# Patient Record
Sex: MALE | Race: WHITE | NOT HISPANIC OR LATINO | Employment: UNEMPLOYED | URBAN - METROPOLITAN AREA
[De-identification: names, ages, dates, MRNs, and addresses within clinical notes are randomized per-mention and may not be internally consistent; named-entity substitution may affect disease eponyms.]

---
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
## 2018-01-22 RX ORDER — TRAZODONE HYDROCHLORIDE 100 MG/1
TABLET ORAL
Qty: 30 TABLET | Refills: 0 | Status: SHIPPED | OUTPATIENT
Start: 2018-01-22 | End: 2018-02-20 | Stop reason: SDUPTHER

## 2018-02-05 RX ORDER — ARIPIPRAZOLE 15 MG/1
TABLET ORAL
Qty: 30 TABLET | Refills: 1 | Status: SHIPPED | OUTPATIENT
Start: 2018-02-05 | End: 2018-04-08 | Stop reason: SDUPTHER

## 2018-02-21 RX ORDER — TRAZODONE HYDROCHLORIDE 100 MG/1
TABLET ORAL
Qty: 30 TABLET | Refills: 0 | Status: SHIPPED | OUTPATIENT
Start: 2018-02-21 | End: 2018-03-23 | Stop reason: SDUPTHER

## 2018-03-23 RX ORDER — TRAZODONE HYDROCHLORIDE 100 MG/1
TABLET ORAL
Qty: 30 TABLET | Refills: 0 | Status: SHIPPED | OUTPATIENT
Start: 2018-03-23 | End: 2018-04-23 | Stop reason: SDUPTHER

## 2018-04-09 RX ORDER — ARIPIPRAZOLE 15 MG/1
TABLET ORAL
Qty: 30 TABLET | Refills: 1 | Status: SHIPPED | OUTPATIENT
Start: 2018-04-09 | End: 2018-05-22

## 2018-04-26 RX ORDER — TRAZODONE HYDROCHLORIDE 100 MG/1
TABLET ORAL
Qty: 30 TABLET | Refills: 0 | Status: SHIPPED | OUTPATIENT
Start: 2018-04-26 | End: 2018-05-22

## 2018-05-14 ENCOUNTER — TELEPHONE (OUTPATIENT)
Dept: FAMILY MEDICINE CLINIC | Facility: CLINIC | Age: 18
End: 2018-05-14

## 2018-05-14 RX ORDER — ZOLPIDEM TARTRATE 10 MG/1
TABLET ORAL
Qty: 30 TABLET | Refills: 4 | OUTPATIENT
Start: 2018-05-14

## 2018-05-14 NOTE — TELEPHONE ENCOUNTER
----- Message from Patricia Hameed sent at 5/14/2018  8:57 AM EDT -----  Contact: BARBARA; MED REFILL   Pt mother called in stating that she had requested a refill on pt zolpidem (AMBIEN) 10 MG tablet but never heard anything back. She would like to know if this can be refilled today. She has made an appointment for Wednesday. She wanted to know if you can no fill the full rx if you could jsut do enough to last till the appointment.         TEDDY FANG   [phone]

## 2018-05-22 ENCOUNTER — OFFICE VISIT (OUTPATIENT)
Dept: FAMILY MEDICINE CLINIC | Facility: CLINIC | Age: 18
End: 2018-05-22

## 2018-05-22 VITALS
HEART RATE: 70 BPM | TEMPERATURE: 98.5 F | SYSTOLIC BLOOD PRESSURE: 118 MMHG | WEIGHT: 123 LBS | DIASTOLIC BLOOD PRESSURE: 80 MMHG

## 2018-05-22 DIAGNOSIS — R56.9 SEIZURE (HCC): ICD-10-CM

## 2018-05-22 DIAGNOSIS — R45.4 IRRITABLE MOOD: ICD-10-CM

## 2018-05-22 DIAGNOSIS — F84.0 ACTIVE AUTISTIC DISORDER: Primary | ICD-10-CM

## 2018-05-22 LAB
ALBUMIN SERPL-MCNC: 4.7 G/DL (ref 3.2–4.8)
ALBUMIN/GLOB SERPL: 1.7 G/DL (ref 1.5–2.5)
ALP SERPL-CCNC: 102 U/L (ref 25–100)
ALT SERPL-CCNC: 20 U/L (ref 7–40)
AST SERPL-CCNC: 30 U/L (ref 0–33)
BASOPHILS # BLD AUTO: 0.02 10*3/MM3 (ref 0–0.2)
BASOPHILS NFR BLD AUTO: 0.5 % (ref 0–1)
BILIRUB SERPL-MCNC: 1.6 MG/DL (ref 0.3–1.2)
BUN SERPL-MCNC: 15 MG/DL (ref 9–23)
BUN/CREAT SERPL: 15 (ref 7–25)
CALCIUM SERPL-MCNC: 9.6 MG/DL (ref 8.7–10.4)
CHLORIDE SERPL-SCNC: 103 MMOL/L (ref 99–109)
CO2 SERPL-SCNC: 32 MMOL/L (ref 20–31)
CREAT SERPL-MCNC: 1 MG/DL (ref 0.6–1.3)
EOSINOPHIL # BLD AUTO: 0.07 10*3/MM3 (ref 0–0.3)
EOSINOPHIL NFR BLD AUTO: 1.7 % (ref 0–3)
ERYTHROCYTE [DISTWIDTH] IN BLOOD BY AUTOMATED COUNT: 14 % (ref 11.3–14.5)
GFR SERPLBLD CREATININE-BSD FMLA CKD-EPI: 117 ML/MIN/1.73
GFR SERPLBLD CREATININE-BSD FMLA CKD-EPI: 96 ML/MIN/1.73
GLOBULIN SER CALC-MCNC: 2.8 GM/DL
GLUCOSE SERPL-MCNC: 102 MG/DL (ref 70–100)
HCT VFR BLD AUTO: 44.2 % (ref 38.9–50.9)
HGB BLD-MCNC: 14.3 G/DL (ref 13.1–17.5)
IMM GRANULOCYTES # BLD: 0.01 10*3/MM3 (ref 0–0.03)
IMM GRANULOCYTES NFR BLD: 0.2 % (ref 0–0.6)
LYMPHOCYTES # BLD AUTO: 1.71 10*3/MM3 (ref 0.6–4.8)
LYMPHOCYTES NFR BLD AUTO: 41.4 % (ref 24–44)
MCH RBC QN AUTO: 28.7 PG (ref 27–31)
MCHC RBC AUTO-ENTMCNC: 32.4 G/DL (ref 32–36)
MCV RBC AUTO: 88.8 FL (ref 80–99)
MONOCYTES # BLD AUTO: 0.43 10*3/MM3 (ref 0–1)
MONOCYTES NFR BLD AUTO: 10.4 % (ref 0–12)
NEUTROPHILS # BLD AUTO: 1.89 10*3/MM3 (ref 1.5–8.3)
NEUTROPHILS NFR BLD AUTO: 45.8 % (ref 41–71)
PLATELET # BLD AUTO: 145 10*3/MM3 (ref 150–450)
POTASSIUM SERPL-SCNC: 4.3 MMOL/L (ref 3.5–5.5)
PROT SERPL-MCNC: 7.5 G/DL (ref 5.7–8.2)
RBC # BLD AUTO: 4.98 10*6/MM3 (ref 4.2–5.76)
SODIUM SERPL-SCNC: 142 MMOL/L (ref 132–146)
VALPROATE SERPL-MCNC: 94 MCG/ML (ref 50–150)
WBC # BLD AUTO: 4.13 10*3/MM3 (ref 4.5–13.5)

## 2018-05-22 PROCEDURE — 99213 OFFICE O/P EST LOW 20 MIN: CPT | Performed by: FAMILY MEDICINE

## 2018-05-22 RX ORDER — ZOLPIDEM TARTRATE 10 MG/1
10 TABLET ORAL NIGHTLY PRN
Qty: 30 TABLET | Refills: 5 | OUTPATIENT
Start: 2018-05-22 | End: 2018-05-22 | Stop reason: SDUPTHER

## 2018-05-22 RX ORDER — RISPERIDONE 3 MG/1
3 TABLET ORAL DAILY
Qty: 60 TABLET | Refills: 5 | Status: SHIPPED | OUTPATIENT
Start: 2018-05-22 | End: 2018-06-15 | Stop reason: SDUPTHER

## 2018-05-22 RX ORDER — ZOLPIDEM TARTRATE 10 MG/1
10 TABLET ORAL NIGHTLY PRN
Qty: 30 TABLET | Refills: 5 | Status: SHIPPED | OUTPATIENT
Start: 2018-05-22 | End: 2018-09-11

## 2018-05-22 NOTE — PROGRESS NOTES
Subjective   Robert Fernandez is a 17 y.o. male.     History of Present Illness     He has been doing well on the risperdal 3 mg daily  We switched him to this from abilify in November  It seems like his body gets used to the medicine and we have to switch from abilify to risperdal    He has had more seizures this last year  He is seeing neurologist in 2 weeks  No recent jaramillo in depakene  They can be associated with poor sleep    The following portions of the patient's history were reviewed and updated as appropriate: allergies, current medications, past family history, past medical history, past social history, past surgical history and problem list.    Review of Systems   Constitutional: Negative.    Neurological: Positive for seizures.       Objective   Physical Exam   Constitutional: He appears well-developed and well-nourished. No distress.   Cardiovascular: Normal rate, regular rhythm and normal heart sounds.    Pulmonary/Chest: Effort normal and breath sounds normal.   Psychiatric:   Autistic mannerisms   Nursing note and vitals reviewed.      Assessment/Plan   Robert was seen today for follow-up.    Diagnoses and all orders for this visit:    Active autistic disorder  -     risperiDONE (RISPERDAL) 3 MG tablet; Take 1 tablet by mouth Daily.  -     CBC & Differential  -     Comprehensive Metabolic Panel    Irritable mood  -     risperiDONE (RISPERDAL) 3 MG tablet; Take 1 tablet by mouth Daily.  -     CBC & Differential  -     Comprehensive Metabolic Panel    Seizure  -     Cancel: Valproic Acid Level, Total; Future  -     Valproic Acid Level, Total    Other orders  -     Discontinue: zolpidem (AMBIEN) 10 MG tablet; Take 1 tablet by mouth At Night As Needed for Sleep.  -     zolpidem (AMBIEN) 10 MG tablet; Take 1 tablet by mouth At Night As Needed for Sleep.  -     Valproic Acid Level, Total    will continue risperdal as pt doing well with thie regimen of medicine  Ok ambien for sleep  Will recheck depakote level  and forward results to neurology for his seiure issu and they will discuss with neurologist  Robin askew for sleep

## 2018-05-30 RX ORDER — CLONAZEPAM 1 MG/1
TABLET, ORALLY DISINTEGRATING ORAL
Qty: 60 TABLET | Refills: 2 | Status: SHIPPED | OUTPATIENT
Start: 2018-05-30 | End: 2018-11-19 | Stop reason: SDUPTHER

## 2018-05-30 RX ORDER — TRAZODONE HYDROCHLORIDE 100 MG/1
TABLET ORAL
Qty: 30 TABLET | Refills: 0 | Status: SHIPPED | OUTPATIENT
Start: 2018-05-30 | End: 2018-06-28 | Stop reason: SDUPTHER

## 2018-06-06 RX ORDER — ARIPIPRAZOLE 15 MG/1
TABLET ORAL
Qty: 30 TABLET | Refills: 0 | OUTPATIENT
Start: 2018-06-06

## 2018-06-15 ENCOUNTER — TELEPHONE (OUTPATIENT)
Dept: FAMILY MEDICINE CLINIC | Facility: CLINIC | Age: 18
End: 2018-06-15

## 2018-06-15 DIAGNOSIS — R45.4 IRRITABLE MOOD: ICD-10-CM

## 2018-06-15 DIAGNOSIS — F84.0 ACTIVE AUTISTIC DISORDER: ICD-10-CM

## 2018-06-15 RX ORDER — RISPERIDONE 4 MG/1
4 TABLET ORAL DAILY
Qty: 30 TABLET | Refills: 2 | Status: SHIPPED | OUTPATIENT
Start: 2018-06-15 | End: 2018-09-04

## 2018-06-15 RX ORDER — RISPERIDONE 3 MG/1
3 TABLET ORAL DAILY
Qty: 30 TABLET | Refills: 5 | Status: SHIPPED | OUTPATIENT
Start: 2018-06-15 | End: 2018-06-15 | Stop reason: SDUPTHER

## 2018-06-15 NOTE — TELEPHONE ENCOUNTER
----- Message from Cuauhtemoc Dailey MD sent at 6/15/2018  1:47 PM EDT -----  Can we confirm if pt taking risperdal 3 mg once a day or twice a day?  There is some confusion on my part.  thanks

## 2018-06-15 NOTE — TELEPHONE ENCOUNTER
Per mom he is taking 3mg once daily however she said she had mention to you in the past about possibly increasing this med.

## 2018-06-28 RX ORDER — TRAZODONE HYDROCHLORIDE 100 MG/1
TABLET ORAL
Qty: 30 TABLET | Refills: 0 | Status: SHIPPED | OUTPATIENT
Start: 2018-06-28 | End: 2018-07-27 | Stop reason: SDUPTHER

## 2018-07-27 RX ORDER — TRAZODONE HYDROCHLORIDE 100 MG/1
TABLET ORAL
Qty: 30 TABLET | Refills: 0 | Status: SHIPPED | OUTPATIENT
Start: 2018-07-27 | End: 2018-08-23 | Stop reason: SDUPTHER

## 2018-07-30 ENCOUNTER — TELEPHONE (OUTPATIENT)
Dept: FAMILY MEDICINE CLINIC | Facility: CLINIC | Age: 18
End: 2018-07-30

## 2018-07-30 NOTE — TELEPHONE ENCOUNTER
----- Message from Demetra Negro sent at 7/26/2018  8:52 AM EDT -----  MOM WANTS TO KNOW IF HE NEEDS VACCINES AND IF SO WHICH ONES. PLEASE CALL BACK AT [phone]

## 2018-08-03 ENCOUNTER — OFFICE VISIT (OUTPATIENT)
Dept: FAMILY MEDICINE CLINIC | Facility: CLINIC | Age: 18
End: 2018-08-03

## 2018-08-03 VITALS
TEMPERATURE: 98.1 F | WEIGHT: 128 LBS | HEART RATE: 74 BPM | DIASTOLIC BLOOD PRESSURE: 70 MMHG | SYSTOLIC BLOOD PRESSURE: 102 MMHG | RESPIRATION RATE: 18 BRPM

## 2018-08-03 DIAGNOSIS — Z23 IMMUNIZATION DUE: Primary | ICD-10-CM

## 2018-08-03 DIAGNOSIS — F84.0 ACTIVE AUTISTIC DISORDER: ICD-10-CM

## 2018-08-03 DIAGNOSIS — K59.1 FUNCTIONAL DIARRHEA: ICD-10-CM

## 2018-08-03 PROCEDURE — 90632 HEPA VACCINE ADULT IM: CPT | Performed by: FAMILY MEDICINE

## 2018-08-03 PROCEDURE — 90734 MENACWYD/MENACWYCRM VACC IM: CPT | Performed by: FAMILY MEDICINE

## 2018-08-03 PROCEDURE — 90472 IMMUNIZATION ADMIN EACH ADD: CPT | Performed by: FAMILY MEDICINE

## 2018-08-03 PROCEDURE — 99213 OFFICE O/P EST LOW 20 MIN: CPT | Performed by: FAMILY MEDICINE

## 2018-08-03 PROCEDURE — 90471 IMMUNIZATION ADMIN: CPT | Performed by: FAMILY MEDICINE

## 2018-08-03 RX ORDER — DIPHENOXYLATE HYDROCHLORIDE AND ATROPINE SULFATE 2.5; .025 MG/1; MG/1
TABLET ORAL
Qty: 60 TABLET | Refills: 2 | Status: SHIPPED | OUTPATIENT
Start: 2018-08-03 | End: 2018-12-07 | Stop reason: SDUPTHER

## 2018-08-04 LAB
25(OH)D3+25(OH)D2 SERPL-MCNC: 38.1 NG/ML (ref 30–100)
ALBUMIN SERPL-MCNC: 4.8 G/DL (ref 3.5–5.5)
ALBUMIN/GLOB SERPL: 2.1 {RATIO} (ref 1.2–2.2)
ALP SERPL-CCNC: 91 IU/L (ref 39–117)
ALT SERPL-CCNC: 18 IU/L (ref 0–44)
AST SERPL-CCNC: 27 IU/L (ref 0–40)
BASOPHILS # BLD AUTO: 0 X10E3/UL (ref 0–0.2)
BASOPHILS NFR BLD AUTO: 0 %
BILIRUB SERPL-MCNC: 0.8 MG/DL (ref 0–1.2)
BUN SERPL-MCNC: 14 MG/DL (ref 6–20)
BUN/CREAT SERPL: 15 (ref 9–20)
CALCIUM SERPL-MCNC: 9.8 MG/DL (ref 8.7–10.2)
CHLORIDE SERPL-SCNC: 103 MMOL/L (ref 96–106)
CO2 SERPL-SCNC: 25 MMOL/L (ref 20–29)
CREAT SERPL-MCNC: 0.93 MG/DL (ref 0.76–1.27)
EOSINOPHIL # BLD AUTO: 0.1 X10E3/UL (ref 0–0.4)
EOSINOPHIL NFR BLD AUTO: 2 %
ERYTHROCYTE [DISTWIDTH] IN BLOOD BY AUTOMATED COUNT: 14.3 % (ref 12.3–15.4)
GLOBULIN SER CALC-MCNC: 2.3 G/DL (ref 1.5–4.5)
GLUCOSE SERPL-MCNC: 94 MG/DL (ref 65–99)
HCT VFR BLD AUTO: 42.4 % (ref 37.5–51)
HGB BLD-MCNC: 14.2 G/DL (ref 13–17.7)
IMM GRANULOCYTES # BLD: 0 X10E3/UL (ref 0–0.1)
IMM GRANULOCYTES NFR BLD: 0 %
LYMPHOCYTES # BLD AUTO: 1.4 X10E3/UL (ref 0.7–3.1)
LYMPHOCYTES NFR BLD AUTO: 18 %
MCH RBC QN AUTO: 29.2 PG (ref 26.6–33)
MCHC RBC AUTO-ENTMCNC: 33.5 G/DL (ref 31.5–35.7)
MCV RBC AUTO: 87 FL (ref 79–97)
MONOCYTES # BLD AUTO: 1.1 X10E3/UL (ref 0.1–0.9)
MONOCYTES NFR BLD AUTO: 14 %
NEUTROPHILS # BLD AUTO: 5.1 X10E3/UL (ref 1.4–7)
NEUTROPHILS NFR BLD AUTO: 66 %
PLATELET # BLD AUTO: 140 X10E3/UL (ref 150–379)
POTASSIUM SERPL-SCNC: 4.4 MMOL/L (ref 3.5–5.2)
PROT SERPL-MCNC: 7.1 G/DL (ref 6–8.5)
RBC # BLD AUTO: 4.87 X10E6/UL (ref 4.14–5.8)
SODIUM SERPL-SCNC: 143 MMOL/L (ref 134–144)
VALPROATE SERPL-MCNC: 83 UG/ML (ref 50–100)
WBC # BLD AUTO: 7.7 X10E3/UL (ref 3.4–10.8)

## 2018-08-06 RX ORDER — CLONIDINE HYDROCHLORIDE 0.1 MG/1
TABLET ORAL
Qty: 180 TABLET | Refills: 0 | Status: SHIPPED | OUTPATIENT
Start: 2018-08-06 | End: 2018-11-05 | Stop reason: SDUPTHER

## 2018-08-23 RX ORDER — TRAZODONE HYDROCHLORIDE 100 MG/1
TABLET ORAL
Qty: 30 TABLET | Refills: 1 | Status: SHIPPED | OUTPATIENT
Start: 2018-08-23 | End: 2018-10-19 | Stop reason: SDUPTHER

## 2018-08-28 ENCOUNTER — OFFICE VISIT (OUTPATIENT)
Dept: FAMILY MEDICINE CLINIC | Facility: CLINIC | Age: 18
End: 2018-08-28

## 2018-08-28 ENCOUNTER — TELEPHONE (OUTPATIENT)
Dept: FAMILY MEDICINE CLINIC | Facility: CLINIC | Age: 18
End: 2018-08-28

## 2018-08-28 VITALS — TEMPERATURE: 97.8 F | WEIGHT: 128 LBS | RESPIRATION RATE: 18 BRPM | HEART RATE: 78 BPM

## 2018-08-28 DIAGNOSIS — R46.89 AGGRESSIVE BEHAVIOR: ICD-10-CM

## 2018-08-28 DIAGNOSIS — F84.0 ACTIVE AUTISTIC DISORDER: Primary | ICD-10-CM

## 2018-08-28 PROCEDURE — 99213 OFFICE O/P EST LOW 20 MIN: CPT | Performed by: FAMILY MEDICINE

## 2018-08-28 RX ORDER — DIAZEPAM 10 MG/1
TABLET ORAL
Qty: 30 TABLET | Refills: 1 | Status: SHIPPED | OUTPATIENT
Start: 2018-08-28 | End: 2019-02-15 | Stop reason: SDUPTHER

## 2018-08-28 RX ORDER — ZIPRASIDONE HYDROCHLORIDE 20 MG/1
20 CAPSULE ORAL 2 TIMES DAILY WITH MEALS
Qty: 60 CAPSULE | Refills: 2 | Status: SHIPPED | OUTPATIENT
Start: 2018-08-28 | End: 2018-09-04

## 2018-08-28 NOTE — PROGRESS NOTES
Subjective   Robert Fernandez is a 17 y.o. male.     History of Present Illness     He continues to have agression over the last week  He has been hitting the windows on the school bus  The abilify helped in the past for about 8 months and then we had to resume risperdal which has worked for a while    He does have mood swings and mod can go from good to bad really fast    All weekend he has been agitated, mad, hitting things at home        Review of Systems   Constitutional: Negative.    Psychiatric/Behavioral: Positive for agitation.       Objective   Physical Exam   Constitutional: He appears well-nourished. No distress.   Autistic behavior exhibited, non verbal, but does make noises.   Neurological: He is alert.   Psychiatric:   Stable autism   Nursing note and vitals reviewed.      Assessment/Plan   Robert was seen today for agitation.    Diagnoses and all orders for this visit:    Active autistic disorder  -     Ambulatory Referral to Behavioral Health  -     ziprasidone (GEODON) 20 MG capsule; Take 1 capsule by mouth 2 (Two) Times a Day With Meals.    Aggressive behavior  -     ziprasidone (GEODON) 20 MG capsule; Take 1 capsule by mouth 2 (Two) Times a Day With Meals.    Other orders  -     diazePAM (VALIUM) 10 MG tablet; 1/2-1 PO qhs at night as needed    discussed long term mood issues.  Will try geodon.  Unsure what happened to orion ZHANG behavioral referral from last year, will work on this again.

## 2018-08-28 NOTE — TELEPHONE ENCOUNTER
----- Message from Vane Olsen sent at 8/28/2018  8:51 AM EDT -----  Contact: BARBARA / MOTHER  MOTHER CALLED - JAMIE HAS BECOME EXTREMELY AGGREVATED WITH MOOD SWINGS AND AGGRESSIVE AT SCHOOL WHILE BEING ON THIS MEDICATION. MOTHER IS CONCERNED.      risperiDONE (risperDAL) 4 MG tablet [[id number]]   Order Details   Dose: 4 mg Route: Oral    MOTHER - CLEO GALEAS [phone]

## 2018-08-29 ENCOUNTER — TELEPHONE (OUTPATIENT)
Dept: FAMILY MEDICINE CLINIC | Facility: CLINIC | Age: 18
End: 2018-08-29

## 2018-08-29 NOTE — TELEPHONE ENCOUNTER
Spoke with pt's mother (on consent) and went over response/instructions. Verbalized understanding.

## 2018-08-29 NOTE — TELEPHONE ENCOUNTER
----- Message from Lay Jimenes sent at 8/29/2018  4:42 PM EDT -----  Contact: BARBARA;MOM CALLED  CLEO CALLED STATING SHE THOUGHT JAMIE WAS TO ONLY BE TAKING 20MG DAILY  ziprasidone (GEODON) 20 MG capsule -BUT THE RX STATES TO TAKE IT 20MG TWICE  DAILY    CLEO - [phone]

## 2018-08-30 DIAGNOSIS — K59.1 FUNCTIONAL DIARRHEA: ICD-10-CM

## 2018-08-31 RX ORDER — RIFAXIMIN 550 MG/1
TABLET ORAL
Qty: 33 TABLET | Refills: 0 | Status: SHIPPED | OUTPATIENT
Start: 2018-08-31 | End: 2018-09-12 | Stop reason: SDUPTHER

## 2018-09-04 ENCOUNTER — TELEPHONE (OUTPATIENT)
Dept: FAMILY MEDICINE CLINIC | Facility: CLINIC | Age: 18
End: 2018-09-04

## 2018-09-04 RX ORDER — ZIPRASIDONE HYDROCHLORIDE 40 MG/1
40 CAPSULE ORAL 2 TIMES DAILY WITH MEALS
Qty: 60 CAPSULE | Refills: 1 | Status: SHIPPED | OUTPATIENT
Start: 2018-09-04 | End: 2018-09-11

## 2018-09-04 NOTE — TELEPHONE ENCOUNTER
Ok, lets increase geodon to 40 mg BID.  We may have to go back to abilify or try something like haldol if this does not improve, please call with update in one week.

## 2018-09-04 NOTE — TELEPHONE ENCOUNTER
----- Message from Vane Olsen sent at 9/4/2018  3:28 PM EDT -----  Contact: BARBARA / CLEO  MOTHER CALLED AND STATED THAT JAMIE IS MORE AGGRESSIVE THAN EVER BEFORE - SHE WOULD LIKE HIS NEW MEDICATION INCREASED OR DO SOMETHING DIFFERENT -  SHE HAD TO LEAVE WORK EARLY TO PICK HIM UP    CLEO  - [phone]    [facility]

## 2018-09-11 ENCOUNTER — TELEPHONE (OUTPATIENT)
Dept: FAMILY MEDICINE CLINIC | Facility: CLINIC | Age: 18
End: 2018-09-11

## 2018-09-11 RX ORDER — ARIPIPRAZOLE 15 MG/1
15 TABLET ORAL DAILY
Qty: 30 TABLET | Refills: 1 | Status: SHIPPED | OUTPATIENT
Start: 2018-09-11 | End: 2018-11-09 | Stop reason: SDUPTHER

## 2018-09-11 RX ORDER — ZOLPIDEM TARTRATE 12.5 MG/1
12.5 TABLET, FILM COATED, EXTENDED RELEASE ORAL NIGHTLY PRN
Qty: 30 TABLET | Refills: 3 | Status: SHIPPED | OUTPATIENT
Start: 2018-09-11 | End: 2019-01-11

## 2018-09-11 NOTE — TELEPHONE ENCOUNTER
----- Message from Patricia Hameed sent at 9/11/2018 10:46 AM EDT -----  Contact: BARBARA;  PT QUESTION   PT MOTHER CALLED IN; SHE WANTED TO KNOW IF PT COULD BE SWITCHED FROM AMBIEN TO THE EXTENDED RELEASE. HE IS NOT STAYING ASLEEP. HE IS WAKING UP AT 3 AM EVERY DAY. THIS HAS BEEN GOING ON FOR ABOUT 3 WEEKS.   PT IS ALSO ON TRAZODONE. SHE WANTED TO SEE IF THEY COULD WEAN HIM OFF OF THIS BECAUSE OF SIDE EFFECTS FROM LONG TERM USE.   ALSO THE ABILIFY IS WORKING WELL.     [phone]  CLEO

## 2018-09-11 NOTE — TELEPHONE ENCOUNTER
What dose of abilify is he on, 10 or 15?  Please confirm for chart  Ok to wean off trazodone  ambien CR will be sent in

## 2018-09-12 DIAGNOSIS — K59.1 FUNCTIONAL DIARRHEA: ICD-10-CM

## 2018-09-12 RX ORDER — RIFAXIMIN 550 MG/1
TABLET ORAL
Qty: 33 TABLET | Refills: 0 | Status: SHIPPED | OUTPATIENT
Start: 2018-09-12 | End: 2018-09-27 | Stop reason: SDUPTHER

## 2018-09-19 DIAGNOSIS — F84.0 ACTIVE AUTISTIC DISORDER: ICD-10-CM

## 2018-09-19 DIAGNOSIS — R45.4 IRRITABLE MOOD: ICD-10-CM

## 2018-09-19 RX ORDER — RISPERIDONE 4 MG/1
TABLET ORAL
Qty: 30 TABLET | Refills: 1 | OUTPATIENT
Start: 2018-09-19

## 2018-09-27 DIAGNOSIS — K59.1 FUNCTIONAL DIARRHEA: ICD-10-CM

## 2018-09-28 RX ORDER — RIFAXIMIN 550 MG/1
TABLET ORAL
Qty: 33 TABLET | Refills: 0 | Status: SHIPPED | OUTPATIENT
Start: 2018-09-28 | End: 2019-03-25

## 2018-10-22 RX ORDER — TRAZODONE HYDROCHLORIDE 100 MG/1
TABLET ORAL
Qty: 30 TABLET | Refills: 0 | Status: SHIPPED | OUTPATIENT
Start: 2018-10-22 | End: 2018-10-22 | Stop reason: SDUPTHER

## 2018-10-22 RX ORDER — TRAZODONE HYDROCHLORIDE 100 MG/1
100 TABLET ORAL
Qty: 30 TABLET | Refills: 4 | Status: SHIPPED | OUTPATIENT
Start: 2018-10-22 | End: 2019-01-11

## 2018-11-05 RX ORDER — CLONIDINE HYDROCHLORIDE 0.1 MG/1
TABLET ORAL
Qty: 180 TABLET | Refills: 0 | Status: SHIPPED | OUTPATIENT
Start: 2018-11-05 | End: 2019-02-02 | Stop reason: SDUPTHER

## 2018-11-09 RX ORDER — ARIPIPRAZOLE 15 MG/1
TABLET ORAL
Qty: 30 TABLET | Refills: 0 | Status: SHIPPED | OUTPATIENT
Start: 2018-11-09 | End: 2018-12-09 | Stop reason: SDUPTHER

## 2018-11-19 RX ORDER — CLONAZEPAM 1 MG/1
TABLET, ORALLY DISINTEGRATING ORAL
Qty: 60 TABLET | Refills: 1 | Status: SHIPPED | OUTPATIENT
Start: 2018-11-19 | End: 2019-01-11 | Stop reason: SDUPTHER

## 2018-12-03 ENCOUNTER — TELEPHONE (OUTPATIENT)
Dept: FAMILY MEDICINE CLINIC | Facility: CLINIC | Age: 18
End: 2018-12-03

## 2018-12-07 ENCOUNTER — TELEPHONE (OUTPATIENT)
Dept: FAMILY MEDICINE CLINIC | Facility: CLINIC | Age: 18
End: 2018-12-07

## 2018-12-07 DIAGNOSIS — K59.1 FUNCTIONAL DIARRHEA: ICD-10-CM

## 2018-12-07 RX ORDER — DIPHENOXYLATE HYDROCHLORIDE AND ATROPINE SULFATE 2.5; .025 MG/1; MG/1
TABLET ORAL
Qty: 120 TABLET | Refills: 2 | Status: SHIPPED | OUTPATIENT
Start: 2018-12-07 | End: 2019-05-07 | Stop reason: SDUPTHER

## 2018-12-07 NOTE — TELEPHONE ENCOUNTER
----- Message from Demetra Negro sent at 12/7/2018 12:54 PM EST -----  Contact: DR LEMA   PATIENT NEEDS A REFILL ON HIS DIARRHEA MEDICATION TO [facility] IN [place]. MOTHER FEELS LIKE THE MEDICATION IS NOT HELPING AS WELL AS IT USED TO. PATIENT MOTHER WANTS TO KNOW IF THERE IS SOMETHING ELSE HE COULD TRY INSTEAD?  [phone]

## 2018-12-07 NOTE — TELEPHONE ENCOUNTER
There is nothing similar to this, could see GI for further evaluation if she wants.  Refills has been sent in

## 2018-12-10 RX ORDER — ARIPIPRAZOLE 15 MG/1
TABLET ORAL
Qty: 30 TABLET | Refills: 2 | Status: SHIPPED | OUTPATIENT
Start: 2018-12-10 | End: 2019-03-10 | Stop reason: SDUPTHER

## 2019-01-11 ENCOUNTER — OFFICE VISIT (OUTPATIENT)
Dept: FAMILY MEDICINE CLINIC | Facility: CLINIC | Age: 19
End: 2019-01-11

## 2019-01-11 VITALS
DIASTOLIC BLOOD PRESSURE: 74 MMHG | SYSTOLIC BLOOD PRESSURE: 98 MMHG | TEMPERATURE: 97.5 F | WEIGHT: 136 LBS | HEART RATE: 82 BPM

## 2019-01-11 DIAGNOSIS — F51.01 PRIMARY INSOMNIA: Primary | ICD-10-CM

## 2019-01-11 DIAGNOSIS — R26.81 GAIT INSTABILITY: ICD-10-CM

## 2019-01-11 DIAGNOSIS — F84.0 ACTIVE AUTISTIC DISORDER: ICD-10-CM

## 2019-01-11 PROCEDURE — 99213 OFFICE O/P EST LOW 20 MIN: CPT | Performed by: FAMILY MEDICINE

## 2019-01-11 RX ORDER — CLONAZEPAM 1 MG/1
TABLET, ORALLY DISINTEGRATING ORAL
Qty: 60 TABLET | Refills: 1 | Status: SHIPPED | OUTPATIENT
Start: 2019-01-11 | End: 2019-04-02 | Stop reason: SDUPTHER

## 2019-01-11 RX ORDER — TEMAZEPAM 30 MG/1
30 CAPSULE ORAL NIGHTLY PRN
Qty: 30 CAPSULE | Refills: 2 | Status: SHIPPED | OUTPATIENT
Start: 2019-01-11 | End: 2019-03-25 | Stop reason: SINTOL

## 2019-01-11 RX ORDER — ZOLPIDEM TARTRATE 10 MG/1
10 TABLET ORAL NIGHTLY PRN
Qty: 30 TABLET | Refills: 2 | Status: SHIPPED | OUTPATIENT
Start: 2019-01-11 | End: 2019-06-25 | Stop reason: SDUPTHER

## 2019-01-11 NOTE — PROGRESS NOTES
Subjective   Robert Fernandez is a 18 y.o. male.     History of Present Illness     His sleep is not doing well  He had a seizure yesterday and mom thinks this is related to sleep deprivation    Used ambien which was better than ambien CR  Diazepam 5 does help a little but his sleep is still really poor    Just hardly sleeping through the night    Flexeril had helped in the past but only used one of dad's pill      abilify is helping his mood overall at school    Review of Systems   Constitutional: Negative.        Objective   Physical Exam   Constitutional:   Stable normal nonverbal autistic state   Psychiatric:   autistic   Nursing note and vitals reviewed.      Assessment/Plan   Robert was seen today for med management.    Diagnoses and all orders for this visit:    Primary insomnia  -     temazepam (RESTORIL) 30 MG capsule; Take 1 capsule by mouth At Night As Needed for Sleep.  -     zolpidem (AMBIEN) 10 MG tablet; Take 1 tablet by mouth At Night As Needed for Sleep.    Active autistic disorder  -     clonazePAM (KlonoPIN) 1 MG disintegrating tablet; 1 BID as needed for acute agitation    Gait instability    complicated issue,  Will try restoril 30 mg daily and ambien PRN for sleep.  Family will call back INB  Ok PRN clonazepam

## 2019-02-04 RX ORDER — CLONIDINE HYDROCHLORIDE 0.1 MG/1
TABLET ORAL
Qty: 180 TABLET | Refills: 0 | Status: SHIPPED | OUTPATIENT
Start: 2019-02-04 | End: 2019-03-25 | Stop reason: SDUPTHER

## 2019-02-04 RX ORDER — ZOLPIDEM TARTRATE 12.5 MG/1
TABLET, FILM COATED, EXTENDED RELEASE ORAL
Qty: 30 TABLET | Refills: 2 | Status: SHIPPED | OUTPATIENT
Start: 2019-02-04 | End: 2019-02-15

## 2019-02-05 ENCOUNTER — TELEPHONE (OUTPATIENT)
Dept: FAMILY MEDICINE CLINIC | Facility: CLINIC | Age: 19
End: 2019-02-05

## 2019-02-05 NOTE — TELEPHONE ENCOUNTER
----- Message from Vane Seth sent at 2/5/2019 12:39 PM EST -----  Contact: PT FATHER AURE; BARBARA  NEEDS TO UPDATE DR JIMENEZ THAT THEY HAVE SWITCHED HIM BACK TO RISPERDONE AND IT DOESN'T SEEM TO BE HELPING HE NEEDS TO KNOW HOW HIGH THEY CAN GO WITH THE DOSAGE BEFORE THEY HAVE TO BRING HIM IN AND MAYBE SWITCH AGAIN.    PHONE: [phone]

## 2019-02-15 ENCOUNTER — TELEPHONE (OUTPATIENT)
Dept: FAMILY MEDICINE CLINIC | Facility: CLINIC | Age: 19
End: 2019-02-15

## 2019-02-15 RX ORDER — DIAZEPAM 10 MG/1
TABLET ORAL
Qty: 30 TABLET | Refills: 1 | Status: SHIPPED | OUTPATIENT
Start: 2019-02-15 | End: 2019-08-18 | Stop reason: SDUPTHER

## 2019-02-15 RX ORDER — ESZOPICLONE 3 MG/1
3 TABLET, FILM COATED ORAL NIGHTLY
Qty: 30 TABLET | Refills: 2 | Status: SHIPPED | OUTPATIENT
Start: 2019-02-15 | End: 2019-06-23 | Stop reason: SDUPTHER

## 2019-02-15 NOTE — TELEPHONE ENCOUNTER
He has failed restoril, ambien, trazodone.    This is a tough one. We could use valium 10 mg QHS again to help him sleep.  I will send this in to try, call back next week with update please.  I just don't think lunesta nor sonata would be helpful for him.

## 2019-02-15 NOTE — TELEPHONE ENCOUNTER
----- Message from Vane Seth Rep sent at 2/15/2019  3:53 PM EST -----  Contact: PT MOTHER KLAUS JIMENEZ  SHE STATES THE temazepam (RESTORIL) 30 MG capsule THAT DR JIMENEZ PRESCRIBED HER SON IS NOT WORKING IT MADE HIM MORE AGGRESSIVE SO THEY HAVE HAD TO STOP IT.  SHE WANTS TO KNOW IF THERE IS SOMETHING ELSE THAT DR JIMENEZ CAN CALL IN BECAUSE SHE STATES JAMIE IS NOT SLEEPING.    MYCHAL LARSON    PT PHONE: [phone]

## 2019-02-15 NOTE — TELEPHONE ENCOUNTER
"Spoke with mom valium 5mg doesn't help him however the 10 make him a \"zombie\" the next day. She would like to try lunesta  "

## 2019-03-07 DIAGNOSIS — F84.0 ACTIVE AUTISTIC DISORDER: ICD-10-CM

## 2019-03-07 DIAGNOSIS — R45.4 IRRITABLE MOOD: ICD-10-CM

## 2019-03-11 RX ORDER — ARIPIPRAZOLE 15 MG/1
TABLET ORAL
Qty: 30 TABLET | Refills: 2 | Status: SHIPPED | OUTPATIENT
Start: 2019-03-11 | End: 2019-06-11 | Stop reason: SDUPTHER

## 2019-03-12 RX ORDER — RISPERIDONE 4 MG/1
TABLET ORAL
Qty: 30 TABLET | Refills: 1 | Status: SHIPPED | OUTPATIENT
Start: 2019-03-12 | End: 2019-05-09 | Stop reason: SDUPTHER

## 2019-03-25 DIAGNOSIS — R46.89 AGGRESSIVE BEHAVIOR: ICD-10-CM

## 2019-03-25 DIAGNOSIS — F51.01 PRIMARY INSOMNIA: ICD-10-CM

## 2019-03-25 DIAGNOSIS — F80.9 LANGUAGE DEVELOPMENT DISORDER: ICD-10-CM

## 2019-03-25 DIAGNOSIS — F84.0 ACTIVE AUTISTIC DISORDER: Primary | ICD-10-CM

## 2019-03-25 RX ORDER — CLONIDINE HYDROCHLORIDE 0.1 MG/1
TABLET ORAL
Qty: 60 TABLET | Refills: 0 | Status: SHIPPED | OUTPATIENT
Start: 2019-03-25 | End: 2019-07-14 | Stop reason: SDUPTHER

## 2019-04-02 ENCOUNTER — TELEPHONE (OUTPATIENT)
Dept: FAMILY MEDICINE CLINIC | Facility: CLINIC | Age: 19
End: 2019-04-02

## 2019-04-02 DIAGNOSIS — F84.0 ACTIVE AUTISTIC DISORDER: ICD-10-CM

## 2019-04-02 RX ORDER — CLONAZEPAM 1 MG/1
TABLET, ORALLY DISINTEGRATING ORAL
Qty: 60 TABLET | Refills: 2 | Status: SHIPPED | OUTPATIENT
Start: 2019-04-02 | End: 2020-01-10 | Stop reason: SDUPTHER

## 2019-04-02 NOTE — TELEPHONE ENCOUNTER
----- Message from Diana Cisse sent at 2019  9:24 AM EDT -----  Contact: BARBARA, PATIENT MOTHER CLEO  clonazePAM (KlonoPIN) 1 MG Dose, Route,   Dispense Quantity: 60 tablet Refills: 1         Si BID as needed for acute agitation          [facility]    [phone] MAY LEAVE A MESSGE

## 2019-04-19 ENCOUNTER — OFFICE VISIT (OUTPATIENT)
Dept: FAMILY MEDICINE CLINIC | Facility: CLINIC | Age: 19
End: 2019-04-19

## 2019-04-19 VITALS — OXYGEN SATURATION: 97 % | WEIGHT: 143 LBS | RESPIRATION RATE: 18 BRPM | TEMPERATURE: 98 F | HEART RATE: 85 BPM

## 2019-04-19 DIAGNOSIS — R50.81 FEVER IN OTHER DISEASES: ICD-10-CM

## 2019-04-19 DIAGNOSIS — J02.9 SORE THROAT: Primary | ICD-10-CM

## 2019-04-19 PROCEDURE — 99213 OFFICE O/P EST LOW 20 MIN: CPT | Performed by: NURSE PRACTITIONER

## 2019-04-19 RX ORDER — AMOXICILLIN 400 MG/5ML
800 POWDER, FOR SUSPENSION ORAL 2 TIMES DAILY
Qty: 200 ML | Refills: 0 | Status: SHIPPED | OUTPATIENT
Start: 2019-04-19 | End: 2019-05-09

## 2019-04-19 NOTE — PROGRESS NOTES
Subjective   Robert Fernandez is a 18 y.o. male.     History of Present Illness   Cough and fever 103 this morning, drooling more than usual and not wanting to eat, pt is accompanied by his parents. Pt is non communicative, but cooperative  Parents are concerned because he usually eats and drinks very well and does not run a temp. Their other child had strep throat last week  No runny nose, drainage from ear, no difficulty breathing noticed by parents  Giving him some Motrin for temp this morning    The following portions of the patient's history were reviewed and updated as appropriate: allergies, current medications, past family history, past medical history, past social history, past surgical history and problem list.    Review of Systems   Unable to perform ROS: Patient nonverbal   Constitutional: Positive for appetite change and fever.   HENT: Positive for drooling and sore throat.    Respiratory: Positive for cough. Negative for shortness of breath and wheezing.    Gastrointestinal: Negative for diarrhea and vomiting.   Skin: Negative for rash.   Allergic/Immunologic: Negative for environmental allergies.   Psychiatric/Behavioral: Negative for sleep disturbance.       Objective   Physical Exam   Constitutional: He appears well-developed and well-nourished. He is cooperative. No distress.   HENT:   Head: Normocephalic.   Right Ear: Tympanic membrane, external ear and ear canal normal.   Left Ear: Tympanic membrane, external ear and ear canal normal.   Nose: Nose normal.   Mouth/Throat: Mucous membranes are normal. Posterior oropharyngeal erythema present.   Neck: Neck supple.   Cardiovascular: Normal rate, regular rhythm, S1 normal, S2 normal and normal heart sounds.   Pulmonary/Chest: Effort normal and breath sounds normal. He has no decreased breath sounds. He has no wheezes. He has no rhonchi. He has no rales.   Lymphadenopathy:     He has no cervical adenopathy.   Neurological: He is alert.   Skin: Skin is warm  and dry. He is not diaphoretic.   Nursing note and vitals reviewed.        Assessment/Plan   Robert was seen today for cough.    Diagnoses and all orders for this visit:    Sore throat    Other orders  -     amoxicillin (AMOXIL) 400 MG/5ML suspension; Take 10 mL by mouth 2 (Two) Times a Day.  -     Chlorcyclizine-Pseudoephed 25-60 MG tablet; Take 1 tablet by mouth 3 (Three) Times a Day.

## 2019-05-07 ENCOUNTER — TELEPHONE (OUTPATIENT)
Dept: FAMILY MEDICINE CLINIC | Facility: CLINIC | Age: 19
End: 2019-05-07

## 2019-05-07 DIAGNOSIS — K59.1 FUNCTIONAL DIARRHEA: ICD-10-CM

## 2019-05-07 RX ORDER — DIPHENOXYLATE HYDROCHLORIDE AND ATROPINE SULFATE 2.5; .025 MG/1; MG/1
TABLET ORAL
Qty: 120 TABLET | Refills: 5 | Status: SHIPPED | OUTPATIENT
Start: 2019-05-07 | End: 2019-11-07 | Stop reason: SDUPTHER

## 2019-05-07 NOTE — TELEPHONE ENCOUNTER
----- Message from Lay Jimenes sent at 5/7/2019  3:38 PM EDT -----  Contact: ANDRES CALLED  REFILL ON diphenoxylate-atropine (LOMOTIL) 2.5-0.025 MG per tablet    MYCHAL AGUAYOOWN    [phone]

## 2019-05-09 ENCOUNTER — OFFICE VISIT (OUTPATIENT)
Dept: FAMILY MEDICINE CLINIC | Facility: CLINIC | Age: 19
End: 2019-05-09

## 2019-05-09 VITALS
HEIGHT: 68 IN | WEIGHT: 142 LBS | RESPIRATION RATE: 20 BRPM | DIASTOLIC BLOOD PRESSURE: 64 MMHG | SYSTOLIC BLOOD PRESSURE: 104 MMHG | BODY MASS INDEX: 21.52 KG/M2 | HEART RATE: 88 BPM | TEMPERATURE: 97.4 F

## 2019-05-09 DIAGNOSIS — F84.0 ACTIVE AUTISTIC DISORDER: ICD-10-CM

## 2019-05-09 DIAGNOSIS — R45.4 IRRITABLE MOOD: ICD-10-CM

## 2019-05-09 DIAGNOSIS — F82 DEVELOPMENTAL COORDINATION DISORDER: ICD-10-CM

## 2019-05-09 DIAGNOSIS — F51.01 PRIMARY INSOMNIA: ICD-10-CM

## 2019-05-09 DIAGNOSIS — Z01.818 PRE-OP EXAMINATION: Primary | ICD-10-CM

## 2019-05-09 DIAGNOSIS — F80.9 LANGUAGE DEVELOPMENT DISORDER: ICD-10-CM

## 2019-05-09 DIAGNOSIS — G40.909 SEIZURE DISORDER (HCC): ICD-10-CM

## 2019-05-09 DIAGNOSIS — R46.89 AGGRESSIVE BEHAVIOR: ICD-10-CM

## 2019-05-09 PROCEDURE — 99213 OFFICE O/P EST LOW 20 MIN: CPT | Performed by: PHYSICIAN ASSISTANT

## 2019-05-09 NOTE — PROGRESS NOTES
Robert Fernandez is a 18 y.o. male  who presents to the office today for a preoperative consultation at the request of surgeon Sidney/Jeffry/Jose Manuel' office who plans on performing routine dental care under anesthesia on May 10. This consultation is requested for the specific conditions prompting preoperative evaluation (i.e. because of potential affect on operative risk): autistic disorder, language development disorder, hx of aggressive behavior, seizure disorder . Planned anesthesia: general. The patient has the following known anesthesia issues: none. Patients bleeding risk: no recent abnormal bleeding. Patient does not have objections to receiving blood products if needed.    The following portions of the patient's history were reviewed and updated as appropriate: allergies, current medications, past family history, past medical history, past social history, past surgical history and problem list.    Review of Systems  Review of Systems   Constitutional: Negative for fatigue.   HENT: Negative for congestion, postnasal drip and sore throat.    Respiratory: Negative for cough and shortness of breath.    Cardiovascular: Negative for chest pain.   Gastrointestinal: Negative for abdominal pain, diarrhea, nausea and vomiting.   Musculoskeletal: Negative for back pain.   Skin: Negative for rash.   Neurological: Positive for seizures and speech difficulty. Negative for syncope and headaches.   Psychiatric/Behavioral:        Autistic disorder, hx of aggression              Physical Exam  Physical Exam   Constitutional: He appears well-developed and well-nourished.   HENT:   Head: Normocephalic.   Right Ear: External ear normal.   Left Ear: External ear normal.   Nose: Nose normal.   Mouth/Throat: Oropharynx is clear and moist.   Eyes: Conjunctivae are normal. Pupils are equal, round, and reactive to light. Right eye exhibits no discharge. Left eye exhibits no discharge.   Neck: No thyromegaly present.   Cardiovascular:  Normal rate, regular rhythm and normal heart sounds.   Pulmonary/Chest: Effort normal and breath sounds normal.   Abdominal: Soft. Bowel sounds are normal. There is no tenderness. There is no guarding.   Lymphadenopathy:     He has no cervical adenopathy.   Neurological: He is alert.   Skin: Skin is warm and dry.   Nursing note and vitals reviewed.            Robert Fernandez is a 18 y.o. male with planned surgery as above.    Known risk factors for perioperative complications: None    Difficulty with intubation is not anticipated.    Cardiac Risk Estimation: low risk     Current medications which may produce withdrawal symptoms if withheld perioperatively: Valproic Acid     1. Preoperative workup as follows history and physical.   2. Change in medication regimen before surgery: continue seizure medication on day of procedure   3. Prophylaxis for cardiac events with perioperative beta-blockers: not indicated.  4. Invasive hemodynamic monitoring perioperatively: not indicated.  5. Deep vein thrombosis prophylaxis postoperatively:N/A.  6. Surveillance for postoperative MI with ECG immediately postoperatively and on postoperative days 1 and 2 AND troponin levels 24 hours postoperatively and on day 4 or hospital discharge (whichever comes first): not indicated.      Discussion     Pt cleared for procedure as scheduled    Seizure medication day of procedure     Cristela Bryant PA-C

## 2019-05-13 RX ORDER — RISPERIDONE 4 MG/1
TABLET ORAL
Qty: 30 TABLET | Refills: 2 | Status: SHIPPED | OUTPATIENT
Start: 2019-05-13 | End: 2019-06-25 | Stop reason: SDUPTHER

## 2019-06-11 RX ORDER — ARIPIPRAZOLE 15 MG/1
TABLET ORAL
Qty: 30 TABLET | Refills: 1 | Status: SHIPPED | OUTPATIENT
Start: 2019-06-11 | End: 2019-08-08 | Stop reason: SDUPTHER

## 2019-06-24 ENCOUNTER — TELEPHONE (OUTPATIENT)
Dept: FAMILY MEDICINE CLINIC | Facility: CLINIC | Age: 19
End: 2019-06-24

## 2019-06-24 DIAGNOSIS — R45.4 IRRITABLE MOOD: ICD-10-CM

## 2019-06-24 DIAGNOSIS — F51.01 PRIMARY INSOMNIA: ICD-10-CM

## 2019-06-24 DIAGNOSIS — F84.0 ACTIVE AUTISTIC DISORDER: ICD-10-CM

## 2019-06-24 RX ORDER — ESZOPICLONE 3 MG/1
TABLET, FILM COATED ORAL
Qty: 30 TABLET | Refills: 1 | Status: SHIPPED | OUTPATIENT
Start: 2019-06-24 | End: 2019-08-20 | Stop reason: SDUPTHER

## 2019-06-24 NOTE — TELEPHONE ENCOUNTER
----- Message from Demetra Negro sent at 6/24/2019  4:41 PM EDT -----  Contact: DR JIMENEZ  MED REFILL ON zolpidem (AMBIEN) 10 MG tablet , risperiDONE (risperDAL) 4 MG tablet, AND eszopiclone (LUNESTA) 3 MG tablet TO [facility] IN [place]. PATIENT IS COMPLETELY OUT OF MEDICATION.  [phone]

## 2019-06-25 RX ORDER — ZOLPIDEM TARTRATE 10 MG/1
10 TABLET ORAL NIGHTLY PRN
Qty: 30 TABLET | Refills: 2 | Status: SHIPPED | OUTPATIENT
Start: 2019-06-25 | End: 2019-10-26 | Stop reason: SDUPTHER

## 2019-06-25 RX ORDER — RISPERIDONE 4 MG/1
4 TABLET ORAL DAILY
Qty: 30 TABLET | Refills: 2 | Status: SHIPPED | OUTPATIENT
Start: 2019-06-25 | End: 2019-08-18 | Stop reason: SDUPTHER

## 2019-07-12 ENCOUNTER — TELEPHONE (OUTPATIENT)
Dept: FAMILY MEDICINE CLINIC | Facility: CLINIC | Age: 19
End: 2019-07-12

## 2019-07-12 NOTE — TELEPHONE ENCOUNTER
----- Message from Salma Peña, Vane Rep sent at 6/26/2019  9:52 AM EDT -----  Contact: PT MOTHER CLEOSpring DAMARISMAYUR  PATIENTS MOTHER IS CALLING TO CHECK ON THE STATUS OF THE PRIOR AUTHORIZATIONS FOR THE MEDICATIONS LISTED BELOW.  SHE STATES SHE JUST CALLED [employer] AND THE PRIOR AUTH STILL HASNT BEEN APPROVED.  SHE STATES THE PATIENT ONLY HAS 1 PILL LEFT OF THE AMBIEN AND THE LUNESTA HE IS COMPLETELY OUT FOR 4-5 DAYS NOW.    zolpidem (AMBIEN) 10 MG tablet     eszopiclone (LUNESTA) 3 MG tablet     PT; [phone]

## 2019-07-15 RX ORDER — CLONIDINE HYDROCHLORIDE 0.1 MG/1
TABLET ORAL
Qty: 180 TABLET | Refills: 0 | Status: SHIPPED | OUTPATIENT
Start: 2019-07-15 | End: 2019-07-27 | Stop reason: SDUPTHER

## 2019-07-29 RX ORDER — CLONIDINE HYDROCHLORIDE 0.1 MG/1
TABLET ORAL
Qty: 180 TABLET | Refills: 0 | Status: SHIPPED | OUTPATIENT
Start: 2019-07-29 | End: 2021-05-11 | Stop reason: SDUPTHER

## 2019-08-08 RX ORDER — ARIPIPRAZOLE 15 MG/1
TABLET ORAL
Qty: 30 TABLET | Refills: 2 | Status: SHIPPED | OUTPATIENT
Start: 2019-08-08 | End: 2019-12-06

## 2019-08-14 ENCOUNTER — TELEPHONE (OUTPATIENT)
Dept: FAMILY MEDICINE CLINIC | Facility: CLINIC | Age: 19
End: 2019-08-14

## 2019-08-14 NOTE — TELEPHONE ENCOUNTER
Spoke w/ pt's mother. She is going to come and  the order tomorrow. She thanked our office and we ended the call.

## 2019-08-14 NOTE — TELEPHONE ENCOUNTER
----- Message from Vane Olsen sent at 8/14/2019  9:48 AM EDT -----  Contact: MORRIN / MOTHER   CLEO -  MOTHER CALLED AND STATED THAT THEY NEED AN ORDER FOR A SEATING AND MOBILTY EVALUATION. AND IT NEEDS TO BE COMPLETED BY A PHYSICAL THERAPIST - CLEO STATED THAT CARDINAL MILLER CAN DO THIS FOR THEM - THE ORDER NEEDS TO COME FROM PATIENTS  PCP    CLEO  - [phone]

## 2019-08-18 DIAGNOSIS — F84.0 ACTIVE AUTISTIC DISORDER: ICD-10-CM

## 2019-08-18 DIAGNOSIS — R45.4 IRRITABLE MOOD: ICD-10-CM

## 2019-08-26 RX ORDER — DIAZEPAM 10 MG/1
TABLET ORAL
Qty: 30 TABLET | Refills: 3 | Status: SHIPPED | OUTPATIENT
Start: 2019-08-26 | End: 2021-05-11 | Stop reason: SDUPTHER

## 2019-08-26 RX ORDER — RISPERIDONE 4 MG/1
TABLET ORAL
Qty: 90 TABLET | Refills: 3 | Status: SHIPPED | OUTPATIENT
Start: 2019-08-26 | End: 2019-12-06

## 2019-08-26 RX ORDER — ESZOPICLONE 3 MG/1
TABLET, FILM COATED ORAL
Qty: 30 TABLET | Refills: 3 | Status: SHIPPED | OUTPATIENT
Start: 2019-08-26 | End: 2020-02-24 | Stop reason: SDUPTHER

## 2019-09-01 ENCOUNTER — DOCUMENTATION (OUTPATIENT)
Dept: FAMILY MEDICINE CLINIC | Facility: CLINIC | Age: 19
End: 2019-09-01

## 2019-09-01 RX ORDER — VANCOMYCIN HYDROCHLORIDE 125 MG/1
125 CAPSULE ORAL 4 TIMES DAILY
Qty: 40 CAPSULE | Refills: 0 | Status: SHIPPED | OUTPATIENT
Start: 2019-09-01 | End: 2020-01-10

## 2019-09-02 NOTE — PROGRESS NOTES
ON CALL NOTE 9/1/19  Mom called concerned about 3 days of worsening diarrhea. Today he had 10-12 bouts the last several were purely mucus in nature. Has a very distinctive smell that unfortunately reminds her of his episode with C. Diff from two years ago. No fever. Hydrating well.    Office is not open tomorrow due to the Labor Day holiday. I sent in a ten day course of vancomycin and asked mom to bring him to the office on Tuesday to have Dr. Dailey see him and obtain stool testing for C.diff. As of 2/18, IDSA no longer recommends metronidazole as initial treatment. Given that fact and that this is his second bout (albeit 2 years ago), I chose the vancomycin.    Robert appears non-toxic to mom, but if he worsens with his hydration status, she can take him to the ED.

## 2019-09-10 ENCOUNTER — OFFICE VISIT (OUTPATIENT)
Dept: FAMILY MEDICINE CLINIC | Facility: CLINIC | Age: 19
End: 2019-09-10

## 2019-09-10 VITALS
BODY MASS INDEX: 20.84 KG/M2 | HEART RATE: 76 BPM | TEMPERATURE: 98.3 F | HEIGHT: 68 IN | WEIGHT: 137.5 LBS | RESPIRATION RATE: 16 BRPM

## 2019-09-10 DIAGNOSIS — H92.11 EAR DRAINAGE RIGHT: Primary | ICD-10-CM

## 2019-09-10 PROCEDURE — 99213 OFFICE O/P EST LOW 20 MIN: CPT | Performed by: PHYSICIAN ASSISTANT

## 2019-09-10 RX ORDER — CIPROFLOXACIN AND DEXAMETHASONE 3; 1 MG/ML; MG/ML
4 SUSPENSION/ DROPS AURICULAR (OTIC) 2 TIMES DAILY
Qty: 7.5 ML | Refills: 0 | Status: SHIPPED | OUTPATIENT
Start: 2019-09-10 | End: 2019-09-17

## 2019-09-10 NOTE — PROGRESS NOTES
"Subjective   Robert Fernandez is a 18 y.o. male.     History of Present Illness   Pt presents with parents for concerns of white drainage from R ear which started today.   Started pushing on it Friday (pt is nonverbal). No suspected FB in ear.   No fever, vomiting, decreased appetite or change in energy level  Has had tubes in the past   Recent C.diff infection that was treated with vancomycin. This has improved. Hx of C. Diff in the past as well.     The following portions of the patient's history were reviewed and updated as appropriate: allergies, current medications, past family history, past medical history, past social history, past surgical history and problem list.    Review of Systems   Constitutional: Negative for activity change, appetite change and fever.   HENT: Positive for ear discharge. Negative for congestion, rhinorrhea and trouble swallowing.    Eyes: Negative for discharge and redness.   Respiratory: Negative for cough.    Gastrointestinal: Negative for diarrhea and vomiting.   Skin: Negative for rash.       Objective    Pulse 76, temperature 98.3 °F (36.8 °C), resp. rate 16, height 172.7 cm (68\"), weight 62.4 kg (137 lb 8 oz).     Physical Exam   Constitutional: He appears well-nourished.   HENT:   Right Ear: There is drainage and swelling.   Left Ear: External ear normal.   Nose: Nose normal.   Mouth/Throat: Oropharynx is clear and moist.   White drainage in ear canal along with some erythema and edema. TM not visible    Eyes: Conjunctivae are normal.   Neck: Normal range of motion. Neck supple.   Cardiovascular: Normal rate, regular rhythm and normal heart sounds.   Pulmonary/Chest: Effort normal and breath sounds normal.   Neurological: He is alert.   Skin: Skin is warm and dry.   Nursing note and vitals reviewed.      Assessment/Plan   Robert was seen today for earache.    Diagnoses and all orders for this visit:    Ear drainage right  -     ciprofloxacin-dexamethasone (CIPRODEX) 0.3-0.1 % otic " suspension; Administer 4 drops to the right ear 2 (Two) Times a Day for 7 days.  -     Ambulatory Referral to ENT (Otolaryngology)    treatment as outlined in plan with ciprodex, suspect external ear infection, however can not rule out punctured TM leading to drainage due to poor visualization. Hesitant to Rx oral abx due to recent C.diff. Will refer back to ENT and call if not improving. Parents agree.

## 2019-09-27 ENCOUNTER — TELEPHONE (OUTPATIENT)
Dept: FAMILY MEDICINE CLINIC | Facility: CLINIC | Age: 19
End: 2019-09-27

## 2019-09-27 DIAGNOSIS — R19.7 DIARRHEA OF PRESUMED INFECTIOUS ORIGIN: Primary | ICD-10-CM

## 2019-09-27 NOTE — TELEPHONE ENCOUNTER
MOTHER CALLED AND STATED DIARRHEA ISSUES ARE WORSE. MEDICINE IS NOT WORKING. HER DAUGHTER HAS APPT HERE TODAY AT 2:30 AND WANTS TO KNOW IF DR JIMENEZ WOULD WANT A CULTURE FOR JAMIE AT THIS TIME. WANTS TO KNOW IF AN ORDER CAN BE CALLED IN FOR CULTUE. PLEASE CALL AND ADVISE. [phone] NEW PHONE NUMBER

## 2019-10-14 PROBLEM — L71.9 ROSACEA: Status: ACTIVE | Noted: 2019-10-14

## 2019-10-14 PROBLEM — Q93.51 ANGELMAN SYNDROME: Status: ACTIVE | Noted: 2019-10-14

## 2019-10-26 DIAGNOSIS — F51.01 PRIMARY INSOMNIA: ICD-10-CM

## 2019-10-28 RX ORDER — ZOLPIDEM TARTRATE 10 MG/1
TABLET ORAL
Qty: 30 TABLET | Refills: 2 | Status: SHIPPED | OUTPATIENT
Start: 2019-10-28 | End: 2021-01-26 | Stop reason: SDUPTHER

## 2019-11-07 DIAGNOSIS — K59.1 FUNCTIONAL DIARRHEA: ICD-10-CM

## 2019-11-11 RX ORDER — DIPHENOXYLATE HYDROCHLORIDE AND ATROPINE SULFATE 2.5; .025 MG/1; MG/1
TABLET ORAL
Qty: 120 TABLET | Refills: 4 | Status: SHIPPED | OUTPATIENT
Start: 2019-11-11 | End: 2020-06-01

## 2019-12-06 ENCOUNTER — OFFICE VISIT (OUTPATIENT)
Dept: FAMILY MEDICINE CLINIC | Facility: CLINIC | Age: 19
End: 2019-12-06

## 2019-12-06 VITALS
TEMPERATURE: 97.8 F | DIASTOLIC BLOOD PRESSURE: 88 MMHG | HEIGHT: 68 IN | BODY MASS INDEX: 20.61 KG/M2 | RESPIRATION RATE: 18 BRPM | SYSTOLIC BLOOD PRESSURE: 122 MMHG | WEIGHT: 136 LBS | HEART RATE: 88 BPM

## 2019-12-06 DIAGNOSIS — R46.89 AGGRESSIVE BEHAVIOR: ICD-10-CM

## 2019-12-06 DIAGNOSIS — R45.4 IRRITABLE MOOD: Primary | ICD-10-CM

## 2019-12-06 DIAGNOSIS — F84.0 ACTIVE AUTISTIC DISORDER: ICD-10-CM

## 2019-12-06 PROCEDURE — 99213 OFFICE O/P EST LOW 20 MIN: CPT | Performed by: FAMILY MEDICINE

## 2019-12-06 RX ORDER — QUETIAPINE FUMARATE 50 MG/1
TABLET, FILM COATED ORAL
Qty: 60 TABLET | Refills: 1 | Status: SHIPPED | OUTPATIENT
Start: 2019-12-06 | End: 2019-12-16

## 2019-12-06 NOTE — PROGRESS NOTES
Assessment/Plan       Problems Addressed this Visit        Other    Active autistic disorder    Relevant Medications    QUEtiapine (SEROQUEL) 50 MG tablet    Irritable mood - Primary    Relevant Medications    QUEtiapine (SEROQUEL) 50 MG tablet    Aggressive behavior    Relevant Medications    QUEtiapine (SEROQUEL) 50 MG tablet            Follow up: Return if symptoms worsen or fail to improve, for follow up depends on review of labs and testing.     DISCUSSION  Since feeling Abilify and switch to risperidone, will try Seroquel.  50 mg twice a day for 2 days then 100 mg twice a day.  May need increased dose.  Parents will call next week with update.  Call with any side effects.  Stop Abilify and risperidone             MEDICATIONS PRESCRIBED  Requested Prescriptions     Signed Prescriptions Disp Refills   • QUEtiapine (SEROQUEL) 50 MG tablet 60 tablet 1     Sig: One po bid x 2 days then 2 po BID        -------------------------------------------    Subjective     Chief Complaint   Patient presents with   • Med Dose Change         History of Present Illness    20 yo    Increased behavioral issues last few weeks  Had been on abilify and switched to Risperidone and not effective  Increased aggression and manic  Has had to come home form school in the past    decreased sleep    On Ambien or Lunesta and clonazepam  Lunesta works better    At times can be very calm.    Then at times very aggressive.      No reported recent illnesses or changes.      Social History     Tobacco Use   Smoking Status Never Smoker          Past Medical History,Medications, Allergies, and social history was reviewed.          Review of Systems   Constitutional: Negative.    HENT: Negative.    Respiratory: Negative.    Cardiovascular: Negative.    Psychiatric/Behavioral: Positive for behavioral problems.       Objective     Vitals:    12/06/19 1107   BP: 122/88   Pulse: 88   Resp: 18   Temp: 97.8 °F (36.6 °C)   Weight: 61.7 kg (136 lb)  "  Height: 172.7 cm (68\")          Physical Exam   Constitutional: He appears well-developed and well-nourished.   HENT:   Head: Normocephalic.   Eyes: EOM are normal. Pupils are equal, round, and reactive to light.   Pulmonary/Chest: Effort normal.   Neurological: He is alert.   Nonverbal.  Gets aggressive with father at times.  Then very calm.   Skin: Skin is warm and dry.   Psychiatric: He is aggressive. He is noncommunicative.   Nursing note and vitals reviewed.                Ramiro Hdz MD    "

## 2019-12-16 ENCOUNTER — TELEPHONE (OUTPATIENT)
Dept: FAMILY MEDICINE CLINIC | Facility: CLINIC | Age: 19
End: 2019-12-16

## 2019-12-16 RX ORDER — QUETIAPINE 150 MG/1
150 TABLET, FILM COATED, EXTENDED RELEASE ORAL 2 TIMES DAILY
Qty: 60 TABLET | Refills: 1 | Status: SHIPPED | OUTPATIENT
Start: 2019-12-16 | End: 2020-01-27

## 2019-12-16 NOTE — TELEPHONE ENCOUNTER
Pt is taking 50 mg tablets, 2 pills twice a day, right?  They do not make a 100 mg Xr, they make a 50 or a 150.  Will change to XR but same dose or do they want to try a little higher dose?   (150 BID?)

## 2019-12-16 NOTE — TELEPHONE ENCOUNTER
PATIENTS MOTHER IS CALLING AND WANTS TO KNOW CAN DR JIMENEZ CHANGE THE PATIENTS SEROQUEL 50 MG TO THE EXTENDED RELEASE.  SHE SAID IT WORKS BUT IT JUST DOESN'T LAST LONG ENOUGH.  AFTER A FEW HOURS IT WEARS OFF.

## 2019-12-16 NOTE — TELEPHONE ENCOUNTER
RETURNING PHONE CALL TO ADILIA AFTER TAKING TO HER  THEY THINK  EXTENDED RELEASE WOULD BE BETTER.

## 2019-12-16 NOTE — TELEPHONE ENCOUNTER
Ok, will try BID dosing but this medicine could be taken at the same time.  We know that what works for most patients has not worked for Robert in the past so we will try various regimen until we find something that does work well for him.  Call with update in a week or 2

## 2020-01-09 DIAGNOSIS — F84.0 ACTIVE AUTISTIC DISORDER: ICD-10-CM

## 2020-01-10 ENCOUNTER — OFFICE VISIT (OUTPATIENT)
Dept: FAMILY MEDICINE CLINIC | Facility: CLINIC | Age: 20
End: 2020-01-10

## 2020-01-10 VITALS
DIASTOLIC BLOOD PRESSURE: 64 MMHG | WEIGHT: 136 LBS | BODY MASS INDEX: 20.61 KG/M2 | SYSTOLIC BLOOD PRESSURE: 124 MMHG | RESPIRATION RATE: 18 BRPM | TEMPERATURE: 97.3 F | HEART RATE: 76 BPM | HEIGHT: 68 IN

## 2020-01-10 DIAGNOSIS — F84.0 ACTIVE AUTISTIC DISORDER: ICD-10-CM

## 2020-01-10 DIAGNOSIS — G40.909 NONINTRACTABLE EPILEPSY WITHOUT STATUS EPILEPTICUS, UNSPECIFIED EPILEPSY TYPE (HCC): Primary | ICD-10-CM

## 2020-01-10 PROCEDURE — 99213 OFFICE O/P EST LOW 20 MIN: CPT | Performed by: FAMILY MEDICINE

## 2020-01-10 RX ORDER — CLONAZEPAM 1 MG/1
1 TABLET, ORALLY DISINTEGRATING ORAL 3 TIMES DAILY PRN
Qty: 90 TABLET | Refills: 1 | Status: SHIPPED | OUTPATIENT
Start: 2020-01-10 | End: 2020-03-13

## 2020-01-10 RX ORDER — QUETIAPINE FUMARATE 50 MG/1
TABLET, FILM COATED ORAL EVERY EVENING
COMMUNITY
Start: 2019-12-23 | End: 2020-01-13

## 2020-01-10 NOTE — PROGRESS NOTES
Subjective   Robert Fernandez is a 19 y.o. male.   Chief Complaint   Patient presents with   • Med Refill     Dr. Dailey had been filling clonazepam for seizures and sleeping. They started having neuro fill and she upped it to 1 mg TID. There was refills at pharmacy but they  from Dr. Dailey and had to be seen because of it being controlled.      History of Present Illness   Present with mom and dad today.   He recently was started on Seroquel per Dr. Hdz for irritability, aggressive behavior.  Parents report that they have seen improvement in behavior since starting the Seroquel.  They are requesting a refill of clonazepam for treatment of his seizures and sleep disturbance.  He follows with a neurologist every 6 months, however unable to be seen there until 2020.  His dose of clonazepam was increased from 1 mg twice daily to 1 mg 3 times daily.  Since then, he has ran out of medication early and needs refill.  His neurologist will not refill this medication until he is seen in the office.    The following portions of the patient's history were reviewed and updated as appropriate: allergies, current medications, past family history, past medical history, past social history, past surgical history and problem list.    Review of Systems   Unable to perform ROS: Patient nonverbal       Objective   Physical Exam   Constitutional: He appears well-developed and well-nourished. No distress.   HENT:   Head: Normocephalic.   Right Ear: External ear normal.   Left Ear: External ear normal.   Nose: Nose normal.   Eyes: Conjunctivae are normal.   Pulmonary/Chest: Effort normal.   Psychiatric: He is not agitated and not aggressive. He is noncommunicative.   Non-verbal   Nursing note and vitals reviewed.        Assessment/Plan   Robert was seen today for med refill.    Diagnoses and all orders for this visit:    Nonintractable epilepsy without status epilepticus, unspecified epilepsy type (CMS/HCC)  -     clonazePAM  (KlonoPIN) 1 MG disintegrating tablet; Take 1 tablet by mouth 3 (Three) Times a Day As Needed for Seizures.    Active autistic disorder  -     clonazePAM (KlonoPIN) 1 MG disintegrating tablet; Take 1 tablet by mouth 3 (Three) Times a Day As Needed for Seizures.      Clonazepam 1 mg 3 times daily refilled for treatment of aggression and seizures.  He has been doing well with this current regimen.  Keep appointment with neurology in February 2020.  He also has diazepam active on medication list, however mom states that they rarely have to provide this medication.  Advised to not combine clonazepam and diazepam.  NEHEMIAS query complete. Treatment plan to include limited course of prescribed controlled substance. Risks including addiction, benefits, and alternatives presented to patient.

## 2020-01-13 RX ORDER — CLONAZEPAM 1 MG/1
TABLET, ORALLY DISINTEGRATING ORAL
Qty: 60 TABLET | Refills: 2 | OUTPATIENT
Start: 2020-01-13

## 2020-01-13 RX ORDER — QUETIAPINE FUMARATE 50 MG/1
TABLET, FILM COATED ORAL
Qty: 60 TABLET | Refills: 0 | Status: SHIPPED | OUTPATIENT
Start: 2020-01-13 | End: 2020-03-23

## 2020-01-17 RX ORDER — CLONIDINE HYDROCHLORIDE 0.1 MG/1
TABLET ORAL
Qty: 180 TABLET | Refills: 0 | OUTPATIENT
Start: 2020-01-17

## 2020-01-27 RX ORDER — QUETIAPINE 150 MG/1
TABLET, FILM COATED, EXTENDED RELEASE ORAL
Qty: 60 TABLET | Refills: 1 | Status: SHIPPED | OUTPATIENT
Start: 2020-01-27 | End: 2020-04-09

## 2020-01-28 ENCOUNTER — TELEPHONE (OUTPATIENT)
Dept: FAMILY MEDICINE CLINIC | Facility: CLINIC | Age: 20
End: 2020-01-28

## 2020-01-28 RX ORDER — QUETIAPINE FUMARATE 50 MG/1
TABLET, FILM COATED ORAL
Qty: 60 TABLET | Refills: 2 | OUTPATIENT
Start: 2020-01-28

## 2020-01-28 NOTE — TELEPHONE ENCOUNTER
PATIENT'S MOM CALLED AND STATED SHE BROUGHT FML PAPERS TO THE OFFICE A COUPLE WEEKS AGO TO BE FILLED OUT. SHE STATED THE PAPER WORK IS DUE NOW AND SHE HAS NOT BEEN CONTACTED. THIS IS NEEDED FOR THE CARE OF JAMIE.    PLEASE CALL BACK CLEO GALEAS [phone]

## 2020-02-12 ENCOUNTER — TELEPHONE (OUTPATIENT)
Dept: FAMILY MEDICINE CLINIC | Facility: CLINIC | Age: 20
End: 2020-02-12

## 2020-02-12 NOTE — TELEPHONE ENCOUNTER
Patient mother calling in callin in stating that the patients PARA- assiasnt for school, was just diagnosed with Flu B and that the mother wants something malave din for him like Tamiflu as a precaution    Pharmacy confirmed

## 2020-02-24 DIAGNOSIS — F51.01 PRIMARY INSOMNIA: Primary | ICD-10-CM

## 2020-02-26 RX ORDER — ESZOPICLONE 3 MG/1
3 TABLET, FILM COATED ORAL NIGHTLY
Qty: 30 TABLET | Refills: 3 | Status: SHIPPED | OUTPATIENT
Start: 2020-02-26 | End: 2020-07-11

## 2020-03-12 DIAGNOSIS — G40.909 NONINTRACTABLE EPILEPSY WITHOUT STATUS EPILEPTICUS, UNSPECIFIED EPILEPSY TYPE (HCC): ICD-10-CM

## 2020-03-12 DIAGNOSIS — F84.0 ACTIVE AUTISTIC DISORDER: ICD-10-CM

## 2020-03-13 RX ORDER — CLONAZEPAM 1 MG/1
TABLET, ORALLY DISINTEGRATING ORAL
Qty: 90 TABLET | Refills: 0 | Status: SHIPPED | OUTPATIENT
Start: 2020-03-13 | End: 2020-04-13

## 2020-03-23 RX ORDER — QUETIAPINE FUMARATE 50 MG/1
TABLET, FILM COATED ORAL
Qty: 60 TABLET | Refills: 3 | Status: SHIPPED | OUTPATIENT
Start: 2020-03-23 | End: 2021-03-15

## 2020-04-09 RX ORDER — QUETIAPINE 150 MG/1
TABLET, FILM COATED, EXTENDED RELEASE ORAL
Qty: 60 TABLET | Refills: 0 | Status: SHIPPED | OUTPATIENT
Start: 2020-04-09 | End: 2020-05-11

## 2020-04-12 DIAGNOSIS — F84.0 ACTIVE AUTISTIC DISORDER: ICD-10-CM

## 2020-04-12 DIAGNOSIS — G40.909 NONINTRACTABLE EPILEPSY WITHOUT STATUS EPILEPTICUS, UNSPECIFIED EPILEPSY TYPE (HCC): ICD-10-CM

## 2020-04-13 RX ORDER — CLONAZEPAM 1 MG/1
TABLET, ORALLY DISINTEGRATING ORAL
Qty: 90 TABLET | Refills: 0 | Status: SHIPPED | OUTPATIENT
Start: 2020-04-13 | End: 2021-02-12 | Stop reason: SDUPTHER

## 2020-05-01 RX ORDER — QUETIAPINE FUMARATE 50 MG/1
TABLET, FILM COATED ORAL
Qty: 60 TABLET | Refills: 2 | OUTPATIENT
Start: 2020-05-01

## 2020-05-11 RX ORDER — QUETIAPINE 150 MG/1
TABLET, FILM COATED, EXTENDED RELEASE ORAL
Qty: 60 TABLET | Refills: 2 | Status: SHIPPED | OUTPATIENT
Start: 2020-05-11 | End: 2020-08-13

## 2020-05-30 DIAGNOSIS — K59.1 FUNCTIONAL DIARRHEA: ICD-10-CM

## 2020-06-01 RX ORDER — DIPHENOXYLATE HYDROCHLORIDE AND ATROPINE SULFATE 2.5; .025 MG/1; MG/1
TABLET ORAL
Qty: 60 TABLET | Refills: 3 | Status: SHIPPED | OUTPATIENT
Start: 2020-06-01 | End: 2020-09-22

## 2020-07-06 DIAGNOSIS — F51.01 PRIMARY INSOMNIA: ICD-10-CM

## 2020-07-11 RX ORDER — ESZOPICLONE 3 MG/1
TABLET, FILM COATED ORAL
Qty: 30 TABLET | Refills: 2 | Status: SHIPPED | OUTPATIENT
Start: 2020-07-11 | End: 2020-10-10

## 2020-08-13 RX ORDER — QUETIAPINE 150 MG/1
TABLET, FILM COATED, EXTENDED RELEASE ORAL
Qty: 60 TABLET | Refills: 1 | Status: SHIPPED | OUTPATIENT
Start: 2020-08-13 | End: 2021-05-11

## 2020-09-10 DIAGNOSIS — F84.0 ACTIVE AUTISTIC DISORDER: ICD-10-CM

## 2020-09-10 DIAGNOSIS — R45.4 IRRITABLE MOOD: ICD-10-CM

## 2020-09-11 RX ORDER — RISPERIDONE 4 MG/1
TABLET ORAL
Qty: 30 TABLET | Refills: 2 | Status: SHIPPED | OUTPATIENT
Start: 2020-09-11 | End: 2020-09-30 | Stop reason: SDUPTHER

## 2020-09-20 DIAGNOSIS — K59.1 FUNCTIONAL DIARRHEA: ICD-10-CM

## 2020-09-22 RX ORDER — DIPHENOXYLATE HYDROCHLORIDE AND ATROPINE SULFATE 2.5; .025 MG/1; MG/1
TABLET ORAL
Qty: 60 TABLET | Refills: 2 | Status: SHIPPED | OUTPATIENT
Start: 2020-09-22

## 2020-09-30 ENCOUNTER — TELEPHONE (OUTPATIENT)
Dept: FAMILY MEDICINE CLINIC | Facility: CLINIC | Age: 20
End: 2020-09-30

## 2020-09-30 DIAGNOSIS — R45.4 IRRITABLE MOOD: ICD-10-CM

## 2020-09-30 DIAGNOSIS — F84.0 ACTIVE AUTISTIC DISORDER: ICD-10-CM

## 2020-09-30 RX ORDER — RISPERIDONE 4 MG/1
6 TABLET ORAL DAILY
Qty: 45 TABLET | Refills: 2 | Status: SHIPPED | OUTPATIENT
Start: 2020-09-30 | End: 2020-12-14

## 2020-09-30 NOTE — TELEPHONE ENCOUNTER
I would be willing to try risperdal 6 mg as he is currently on 4mg.  Will send in new script.    I still strongly feel he needs a mental health expert to assist in his care and would like to point out I have not seen mika since Jan of 2019!!!    Please schedule follow up

## 2020-09-30 NOTE — TELEPHONE ENCOUNTER
----- Message from Robert Fernandez sent at 9/30/2020 12:15 AM EDT -----  Regarding: Non-Urgent Medical Question  Contact: [phone]  Dr. Dailey,  After a week and a half of going through yet another manic phase with Robert we are exhausted! He is still taking his resperidone, but hasn’t been sleeping and has been SUPER aggressive... does his resperidone need to be increased and/or should he be taking an antidepressant or stimulant for ADHD in conjunction with the resperidone? 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 We are visiting my brother on Thursday and are truly worried how Robert will cope. Please help!!!

## 2020-10-09 DIAGNOSIS — F51.01 PRIMARY INSOMNIA: ICD-10-CM

## 2020-10-10 RX ORDER — ESZOPICLONE 3 MG/1
TABLET, FILM COATED ORAL
Qty: 30 TABLET | Refills: 1 | Status: SHIPPED | OUTPATIENT
Start: 2020-10-10 | End: 2020-12-21

## 2020-12-11 DIAGNOSIS — R45.4 IRRITABLE MOOD: ICD-10-CM

## 2020-12-11 DIAGNOSIS — F84.0 ACTIVE AUTISTIC DISORDER: ICD-10-CM

## 2020-12-14 RX ORDER — RISPERIDONE 4 MG/1
TABLET ORAL
Qty: 30 TABLET | Refills: 3 | Status: SHIPPED | OUTPATIENT
Start: 2020-12-14 | End: 2021-04-12

## 2020-12-20 DIAGNOSIS — F51.01 PRIMARY INSOMNIA: ICD-10-CM

## 2020-12-21 RX ORDER — ESZOPICLONE 3 MG/1
TABLET, FILM COATED ORAL
Qty: 30 TABLET | Refills: 2 | Status: SHIPPED | OUTPATIENT
Start: 2020-12-21 | End: 2021-05-03

## 2021-01-26 DIAGNOSIS — F51.01 PRIMARY INSOMNIA: ICD-10-CM

## 2021-01-27 RX ORDER — ZOLPIDEM TARTRATE 10 MG/1
10 TABLET ORAL NIGHTLY PRN
Qty: 30 TABLET | Refills: 2 | Status: SHIPPED | OUTPATIENT
Start: 2021-01-27 | End: 2021-06-23

## 2021-02-12 DIAGNOSIS — G40.909 NONINTRACTABLE EPILEPSY WITHOUT STATUS EPILEPTICUS, UNSPECIFIED EPILEPSY TYPE (HCC): ICD-10-CM

## 2021-02-12 DIAGNOSIS — F84.0 ACTIVE AUTISTIC DISORDER: ICD-10-CM

## 2021-02-15 RX ORDER — CLONAZEPAM 1 MG/1
1 TABLET, ORALLY DISINTEGRATING ORAL 3 TIMES DAILY PRN
Qty: 30 TABLET | Refills: 0 | Status: SHIPPED | OUTPATIENT
Start: 2021-02-15 | End: 2021-09-27 | Stop reason: SDUPTHER

## 2021-02-15 NOTE — TELEPHONE ENCOUNTER
Pt needs to follow up with PCP. Last seen Jan 2020 and Dr. Dailey advised following up in Sept 2020.

## 2021-02-22 NOTE — TELEPHONE ENCOUNTER
I see pt is on the schedule for today.  This is a tough question with multiple options.  I do think that an appointment would be a better option to make a plan and have everyone on the same page   Satisfactory

## 2021-03-11 ENCOUNTER — TELEPHONE (OUTPATIENT)
Dept: FAMILY MEDICINE CLINIC | Facility: CLINIC | Age: 21
End: 2021-03-11

## 2021-03-15 RX ORDER — QUETIAPINE FUMARATE 50 MG/1
TABLET, FILM COATED ORAL
Qty: 60 TABLET | Refills: 1 | Status: SHIPPED | OUTPATIENT
Start: 2021-03-15 | End: 2021-04-12

## 2021-03-15 NOTE — TELEPHONE ENCOUNTER
I did refill his seroquel but he has not been seen in over a year.  Could we schedule him for a follow up with me to simply review overall status and medical regimen.  thanks

## 2021-03-15 NOTE — TELEPHONE ENCOUNTER
Unable to leave  for Priti, patient's mother to return call. VM box is full. I will send [employer] message with message from Dr. Dailey.

## 2021-04-11 DIAGNOSIS — R45.4 IRRITABLE MOOD: ICD-10-CM

## 2021-04-11 DIAGNOSIS — F84.0 ACTIVE AUTISTIC DISORDER: ICD-10-CM

## 2021-04-12 RX ORDER — QUETIAPINE FUMARATE 50 MG/1
TABLET, FILM COATED ORAL
Qty: 60 TABLET | Refills: 0 | Status: SHIPPED | OUTPATIENT
Start: 2021-04-12 | End: 2021-04-26

## 2021-04-12 RX ORDER — RISPERIDONE 4 MG/1
TABLET ORAL
Qty: 30 TABLET | Refills: 2 | Status: SHIPPED | OUTPATIENT
Start: 2021-04-12 | End: 2021-07-09

## 2021-04-26 RX ORDER — QUETIAPINE FUMARATE 50 MG/1
TABLET, FILM COATED ORAL
Qty: 60 TABLET | Refills: 0 | Status: SHIPPED | OUTPATIENT
Start: 2021-04-26 | End: 2021-05-11

## 2021-04-30 ENCOUNTER — TELEPHONE (OUTPATIENT)
Dept: FAMILY MEDICINE CLINIC | Facility: CLINIC | Age: 21
End: 2021-04-30

## 2021-04-30 DIAGNOSIS — F51.01 PRIMARY INSOMNIA: ICD-10-CM

## 2021-05-03 RX ORDER — ESZOPICLONE 3 MG/1
TABLET, FILM COATED ORAL
Qty: 14 TABLET | Refills: 0 | Status: SHIPPED | OUTPATIENT
Start: 2021-05-03 | End: 2021-11-23

## 2021-05-03 NOTE — TELEPHONE ENCOUNTER
He is very overdue for follow up with me, I have not seen him in over 2 years  Did send off 2 week supply

## 2021-05-11 ENCOUNTER — OFFICE VISIT (OUTPATIENT)
Dept: FAMILY MEDICINE CLINIC | Facility: CLINIC | Age: 21
End: 2021-05-11

## 2021-05-11 VITALS
BODY MASS INDEX: 22.2 KG/M2 | DIASTOLIC BLOOD PRESSURE: 84 MMHG | HEART RATE: 78 BPM | SYSTOLIC BLOOD PRESSURE: 120 MMHG | WEIGHT: 146 LBS | TEMPERATURE: 97.5 F

## 2021-05-11 DIAGNOSIS — F51.01 PRIMARY INSOMNIA: ICD-10-CM

## 2021-05-11 DIAGNOSIS — R46.89 AGGRESSIVE BEHAVIOR: ICD-10-CM

## 2021-05-11 DIAGNOSIS — F84.0 ACTIVE AUTISTIC DISORDER: Primary | ICD-10-CM

## 2021-05-11 PROCEDURE — 99214 OFFICE O/P EST MOD 30 MIN: CPT | Performed by: FAMILY MEDICINE

## 2021-05-11 RX ORDER — QUETIAPINE FUMARATE 50 MG/1
TABLET, FILM COATED ORAL
Qty: 60 TABLET | Refills: 2 | Status: SHIPPED | OUTPATIENT
Start: 2021-05-11 | End: 2021-08-09

## 2021-05-11 RX ORDER — TEMAZEPAM 30 MG/1
30 CAPSULE ORAL NIGHTLY PRN
Qty: 30 CAPSULE | Refills: 3 | Status: SHIPPED | OUTPATIENT
Start: 2021-05-11 | End: 2021-09-17

## 2021-05-11 RX ORDER — DIAZEPAM 10 MG/1
TABLET ORAL
Qty: 30 TABLET | Refills: 3 | Status: SHIPPED | OUTPATIENT
Start: 2021-05-11 | End: 2021-12-20

## 2021-05-11 RX ORDER — ARIPIPRAZOLE 20 MG/1
30 TABLET ORAL DAILY
Qty: 135 TABLET | Refills: 0 | Status: SHIPPED | OUTPATIENT
Start: 2021-05-11 | End: 2021-09-02

## 2021-05-11 RX ORDER — CLONIDINE HYDROCHLORIDE 0.2 MG/1
0.2 TABLET ORAL NIGHTLY PRN
Qty: 90 TABLET | Refills: 0 | Status: SHIPPED | OUTPATIENT
Start: 2021-05-11 | End: 2021-08-05

## 2021-05-11 NOTE — PROGRESS NOTES
Subjective   Robert Fernandez is a 20 y.o. male.     History of Present Illness     Overall pt has been doing well  He has not enjoyed being at home all the time  Medicines will work for a while and then we have to change things up  abilify seems to be working better than riosperdal    Sleep is an issue for him  He sometimes has issues falling asleep no matter what we try  He just does not need to sleep without the medicine    The following portions of the patient's history were reviewed and updated as appropriate: allergies, current medications, past family history, past medical history, past social history, past surgical history and problem list.    Review of Systems   Constitutional: Negative.    Psychiatric/Behavioral: Positive for agitation.       Objective   Physical Exam  Constitutional:       Comments: autistic   Cardiovascular:      Rate and Rhythm: Normal rate.      Heart sounds: Normal heart sounds.   Pulmonary:      Effort: Pulmonary effort is normal.      Breath sounds: Normal breath sounds.   Psychiatric:      Comments: noncommunicative         Assessment/Plan   Diagnoses and all orders for this visit:    1. Active autistic disorder (Primary)  -     ARIPiprazole (Abilify) 20 MG tablet; Take 1.5 tablets by mouth Daily.  Dispense: 135 tablet; Refill: 0    2. Primary insomnia  -     cloNIDine (CATAPRES) 0.2 MG tablet; Take 1 tablet by mouth At Night As Needed (insomnia).  Dispense: 90 tablet; Refill: 0  -     temazepam (Restoril) 30 MG capsule; Take 1 capsule by mouth At Night As Needed for Sleep.  Dispense: 30 capsule; Refill: 3    3. Aggressive behavior  -     ARIPiprazole (Abilify) 20 MG tablet; Take 1.5 tablets by mouth Daily.  Dispense: 135 tablet; Refill: 0  -     diazePAM (VALIUM) 10 MG tablet; TAKE ONE-HALF TO ONE TABLET BY MOUTH EVERY NIGHT AT BEDTIME AS NEEDED  Dispense: 30 tablet; Refill: 3  -     QUEtiapine (SEROquel) 50 MG tablet; 1 PO BID PRN  Dispense: 60 tablet; Refill: 2    he is a difficult  case as agitation is a major issue along with sleep.  Will try restoril for sleep and increase clonidine to 0.2  Will increase abilify to 30 mg and ok for PRN seroquel, it just does not last long enough during the day and then the XR version is not sedating enough.  Discussed multiple options for meds and will continue to rotate them as well

## 2021-06-21 DIAGNOSIS — F51.01 PRIMARY INSOMNIA: ICD-10-CM

## 2021-06-23 RX ORDER — ZOLPIDEM TARTRATE 10 MG/1
TABLET ORAL
Qty: 30 TABLET | Refills: 1 | Status: SHIPPED | OUTPATIENT
Start: 2021-06-23 | End: 2021-06-28

## 2021-06-24 ENCOUNTER — TELEPHONE (OUTPATIENT)
Dept: FAMILY MEDICINE CLINIC | Facility: CLINIC | Age: 21
End: 2021-06-24

## 2021-06-24 NOTE — TELEPHONE ENCOUNTER
HOME CARE CALLED NEEDING A PRESCRIPTION SIGNED AND SENT BACK FOR DISPOSABLES FOR THE PT. SHE HAS FAXED ANOTHER FORM THAT HAS TO BE SIGNED BY DR. JIMENEZ.

## 2021-06-27 DIAGNOSIS — F51.01 PRIMARY INSOMNIA: ICD-10-CM

## 2021-06-28 RX ORDER — ZOLPIDEM TARTRATE 10 MG/1
TABLET ORAL
Qty: 30 TABLET | Refills: 2 | Status: SHIPPED | OUTPATIENT
Start: 2021-06-28 | End: 2021-11-23

## 2021-06-30 ENCOUNTER — TELEPHONE (OUTPATIENT)
Dept: FAMILY MEDICINE CLINIC | Facility: CLINIC | Age: 21
End: 2021-06-30

## 2021-07-09 DIAGNOSIS — F84.0 ACTIVE AUTISTIC DISORDER: ICD-10-CM

## 2021-07-09 DIAGNOSIS — R45.4 IRRITABLE MOOD: ICD-10-CM

## 2021-07-09 RX ORDER — RISPERIDONE 4 MG/1
TABLET ORAL
Qty: 30 TABLET | Refills: 5 | Status: SHIPPED | OUTPATIENT
Start: 2021-07-09 | End: 2021-11-23

## 2021-08-05 DIAGNOSIS — F51.01 PRIMARY INSOMNIA: ICD-10-CM

## 2021-08-05 RX ORDER — CLONIDINE HYDROCHLORIDE 0.2 MG/1
TABLET ORAL
Qty: 90 TABLET | Refills: 0 | Status: SHIPPED | OUTPATIENT
Start: 2021-08-05 | End: 2021-09-27

## 2021-08-07 DIAGNOSIS — R46.89 AGGRESSIVE BEHAVIOR: ICD-10-CM

## 2021-08-09 RX ORDER — QUETIAPINE FUMARATE 50 MG/1
TABLET, FILM COATED ORAL
Qty: 60 TABLET | Refills: 2 | Status: SHIPPED | OUTPATIENT
Start: 2021-08-09 | End: 2021-08-23

## 2021-08-23 DIAGNOSIS — R46.89 AGGRESSIVE BEHAVIOR: ICD-10-CM

## 2021-08-23 RX ORDER — QUETIAPINE FUMARATE 50 MG/1
TABLET, FILM COATED ORAL
Qty: 60 TABLET | Refills: 2 | Status: SHIPPED | OUTPATIENT
Start: 2021-08-23 | End: 2021-11-19

## 2021-09-02 DIAGNOSIS — R46.89 AGGRESSIVE BEHAVIOR: ICD-10-CM

## 2021-09-02 DIAGNOSIS — F84.0 ACTIVE AUTISTIC DISORDER: ICD-10-CM

## 2021-09-02 RX ORDER — ARIPIPRAZOLE 20 MG/1
TABLET ORAL
Qty: 45 TABLET | Refills: 2 | Status: SHIPPED | OUTPATIENT
Start: 2021-09-02 | End: 2021-11-23

## 2021-09-15 DIAGNOSIS — F51.01 PRIMARY INSOMNIA: ICD-10-CM

## 2021-09-17 RX ORDER — TEMAZEPAM 30 MG/1
CAPSULE ORAL
Qty: 30 CAPSULE | Refills: 2 | Status: SHIPPED | OUTPATIENT
Start: 2021-09-17 | End: 2021-11-23 | Stop reason: SDUPTHER

## 2021-09-25 DIAGNOSIS — F51.01 PRIMARY INSOMNIA: ICD-10-CM

## 2021-09-27 DIAGNOSIS — G40.909 NONINTRACTABLE EPILEPSY WITHOUT STATUS EPILEPTICUS, UNSPECIFIED EPILEPSY TYPE (HCC): ICD-10-CM

## 2021-09-27 DIAGNOSIS — F84.0 ACTIVE AUTISTIC DISORDER: ICD-10-CM

## 2021-09-27 RX ORDER — CLONIDINE HYDROCHLORIDE 0.2 MG/1
TABLET ORAL
Qty: 90 TABLET | Refills: 0 | Status: SHIPPED | OUTPATIENT
Start: 2021-09-27 | End: 2021-11-23

## 2021-09-28 RX ORDER — CLONAZEPAM 1 MG/1
1 TABLET, ORALLY DISINTEGRATING ORAL 3 TIMES DAILY PRN
Qty: 30 TABLET | Refills: 2 | Status: SHIPPED | OUTPATIENT
Start: 2021-09-28 | End: 2022-06-24 | Stop reason: SDUPTHER

## 2021-11-19 DIAGNOSIS — R46.89 AGGRESSIVE BEHAVIOR: ICD-10-CM

## 2021-11-19 RX ORDER — QUETIAPINE FUMARATE 50 MG/1
TABLET, FILM COATED ORAL
Qty: 60 TABLET | Refills: 5 | Status: SHIPPED | OUTPATIENT
Start: 2021-11-19 | End: 2022-08-09

## 2021-11-23 ENCOUNTER — OFFICE VISIT (OUTPATIENT)
Dept: FAMILY MEDICINE CLINIC | Facility: CLINIC | Age: 21
End: 2021-11-23

## 2021-11-23 VITALS
SYSTOLIC BLOOD PRESSURE: 118 MMHG | WEIGHT: 160 LBS | BODY MASS INDEX: 24.33 KG/M2 | TEMPERATURE: 98.7 F | DIASTOLIC BLOOD PRESSURE: 78 MMHG

## 2021-11-23 DIAGNOSIS — R56.9 SEIZURE (HCC): ICD-10-CM

## 2021-11-23 DIAGNOSIS — F51.01 PRIMARY INSOMNIA: Primary | ICD-10-CM

## 2021-11-23 DIAGNOSIS — F84.0 ACTIVE AUTISTIC DISORDER: ICD-10-CM

## 2021-11-23 DIAGNOSIS — Z11.59 ENCOUNTER FOR HEPATITIS C SCREENING TEST FOR LOW RISK PATIENT: ICD-10-CM

## 2021-11-23 DIAGNOSIS — R45.4 IRRITABLE MOOD: ICD-10-CM

## 2021-11-23 DIAGNOSIS — R46.89 AGGRESSIVE BEHAVIOR: ICD-10-CM

## 2021-11-23 PROCEDURE — 99214 OFFICE O/P EST MOD 30 MIN: CPT | Performed by: FAMILY MEDICINE

## 2021-11-23 RX ORDER — LURASIDONE HYDROCHLORIDE 60 MG/1
TABLET, FILM COATED ORAL
Qty: 60 TABLET | Refills: 5 | Status: SHIPPED | OUTPATIENT
Start: 2021-11-23 | End: 2021-11-24

## 2021-11-23 RX ORDER — CYCLOBENZAPRINE HCL 10 MG
TABLET ORAL
Qty: 30 TABLET | Refills: 5 | Status: SHIPPED | OUTPATIENT
Start: 2021-11-23 | End: 2022-08-22

## 2021-11-23 RX ORDER — TEMAZEPAM 30 MG/1
30 CAPSULE ORAL NIGHTLY PRN
Qty: 30 CAPSULE | Refills: 5 | Status: SHIPPED | OUTPATIENT
Start: 2021-11-23 | End: 2022-06-21

## 2021-11-23 NOTE — PROGRESS NOTES
Subjective   Robert Fernandez is a 20 y.o. male.     History of Present Illness     Mood continues to be a major issue for him    Behavior is worse in the day, more aggressive and agitated in the day    He does not sleep much  flexeril has helped more than other things  He is up multiple times no matter what    seroquel 100 can work for sleep as well  He continues to take this as it does help    The following portions of the patient's history were reviewed and updated as appropriate: allergies, current medications, past family history, past medical history, past social history, past surgical history and problem list.    Review of Systems   Constitutional: Negative.        Objective   Physical Exam  Vitals and nursing note reviewed.   Constitutional:       General: He is not in acute distress.     Appearance: Normal appearance. He is well-developed.   Cardiovascular:      Rate and Rhythm: Normal rate and regular rhythm.      Heart sounds: Normal heart sounds.   Pulmonary:      Effort: Pulmonary effort is normal.      Breath sounds: Normal breath sounds.   Neurological:      Mental Status: He is alert.   Psychiatric:      Comments: Pt nonverbal in chronic state, stable with history of autism         Assessment/Plan   Diagnoses and all orders for this visit:    1. Primary insomnia (Primary)  -     cyclobenzaprine (FLEXERIL) 10 MG tablet; 1 PO QHS  Dispense: 30 tablet; Refill: 5  -     temazepam (RESTORIL) 30 MG capsule; Take 1 capsule by mouth At Night As Needed for Sleep.  Dispense: 30 capsule; Refill: 5    2. Active autistic disorder  -     lurasidone HCl (LATUDA) 60 MG tablet tablet; 1 PO QD 1 week and then 2 PO QD  Dispense: 60 tablet; Refill: 5    3. Aggressive behavior  -     lurasidone HCl (LATUDA) 60 MG tablet tablet; 1 PO QD 1 week and then 2 PO QD  Dispense: 60 tablet; Refill: 5    4. Irritable mood  -     lurasidone HCl (LATUDA) 60 MG tablet tablet; 1 PO QD 1 week and then 2 PO QD  Dispense: 60 tablet; Refill:  5    5. Seizure (HCC)  -     CBC & Differential  -     Comprehensive Metabolic Panel  -     Valproic Acid Level, Free  -     Valproic Acid Level, Total    6. Encounter for hepatitis C screening test for low risk patient  -     Comprehensive Metabolic Panel  -     Hepatitis C Antibody    we again discusse dthe complexity of his care and numbers given for shawn, [facility] [employer] [facility] [place], and [facility]uel leyva who provide care for autistic adults in the state of [place].  Looking for any help due to severe mental deficits with Robert and his autism  Will try latuda instead of abilify and risperdal.  They would alternate these other two in the past for his agitation.  Will use flexeril for sleep, continue PRN seroquelk and temazepam as well  Will check labs today  As well.    Will stop risperdal 6 mg as well as abilify (had debbie alternating)

## 2021-11-24 ENCOUNTER — TELEPHONE (OUTPATIENT)
Dept: FAMILY MEDICINE CLINIC | Facility: CLINIC | Age: 21
End: 2021-11-24

## 2021-11-24 DIAGNOSIS — R46.89 AGGRESSIVE BEHAVIOR: ICD-10-CM

## 2021-11-24 DIAGNOSIS — R45.4 IRRITABLE MOOD: ICD-10-CM

## 2021-11-24 DIAGNOSIS — F84.0 ACTIVE AUTISTIC DISORDER: Primary | ICD-10-CM

## 2021-11-24 RX ORDER — LURASIDONE HYDROCHLORIDE 60 MG/1
60 TABLET, FILM COATED ORAL DAILY
Qty: 30 TABLET | Refills: 0 | Status: SHIPPED | OUTPATIENT
Start: 2021-11-24 | End: 2021-12-22

## 2021-11-24 RX ORDER — LURASIDONE HYDROCHLORIDE 120 MG/1
120 TABLET, FILM COATED ORAL DAILY
Qty: 30 TABLET | Refills: 3 | Status: SHIPPED | OUTPATIENT
Start: 2021-11-24 | End: 2022-06-13 | Stop reason: SDUPTHER

## 2021-11-24 NOTE — TELEPHONE ENCOUNTER
Caller: ZACHERYRILEY [address] - [address] [place] AT [facility] - [phone]  - [phone] FX    Relationship: Pharmacy    Best call back number: [phone]    Which medication are you concerned about: Lurasidone HCl (Latuda) 120 MG tablet tablet AND lurasidone HCl (Latuda) 60 MG tablet tablet    Who prescribed you this medication: DR. JIMENEZ    What are your concerns: PHARMACIST STATED THEY RECEIVED BOTH OF THESE PRESCRIPTIONS AND WOULD LIKE TO VERIFY WHICH DOSAGE IS CORRECT.

## 2021-11-24 NOTE — TELEPHONE ENCOUNTER
Ins will not pay for latuda as written. Needs to be two different rx's one for the 60mg one daily then for for 120 one daily.

## 2021-11-30 LAB
ALBUMIN SERPL-MCNC: 5 G/DL (ref 4.1–5.2)
ALBUMIN/GLOB SERPL: 2 {RATIO} (ref 1.2–2.2)
ALP SERPL-CCNC: 84 IU/L (ref 44–121)
ALT SERPL-CCNC: 47 IU/L (ref 0–44)
AST SERPL-CCNC: 36 IU/L (ref 0–40)
BASOPHILS # BLD AUTO: 0 X10E3/UL (ref 0–0.2)
BASOPHILS NFR BLD AUTO: 1 %
BILIRUB SERPL-MCNC: 0.6 MG/DL (ref 0–1.2)
BUN SERPL-MCNC: 18 MG/DL (ref 6–20)
BUN/CREAT SERPL: 16 (ref 9–20)
CALCIUM SERPL-MCNC: 10.3 MG/DL (ref 8.7–10.2)
CHLORIDE SERPL-SCNC: 103 MMOL/L (ref 96–106)
CO2 SERPL-SCNC: 28 MMOL/L (ref 20–29)
CREAT SERPL-MCNC: 1.11 MG/DL (ref 0.76–1.27)
EOSINOPHIL # BLD AUTO: 0 X10E3/UL (ref 0–0.4)
EOSINOPHIL NFR BLD AUTO: 1 %
ERYTHROCYTE [DISTWIDTH] IN BLOOD BY AUTOMATED COUNT: 13.7 % (ref 11.6–15.4)
GLOBULIN SER CALC-MCNC: 2.5 G/DL (ref 1.5–4.5)
GLUCOSE SERPL-MCNC: 112 MG/DL (ref 65–99)
HCT VFR BLD AUTO: 43.2 % (ref 37.5–51)
HCV AB S/CO SERPL IA: <0.1 S/CO RATIO (ref 0–0.9)
HGB BLD-MCNC: 14.4 G/DL (ref 13–17.7)
IMM GRANULOCYTES # BLD AUTO: 0 X10E3/UL (ref 0–0.1)
IMM GRANULOCYTES NFR BLD AUTO: 0 %
LYMPHOCYTES # BLD AUTO: 1.5 X10E3/UL (ref 0.7–3.1)
LYMPHOCYTES NFR BLD AUTO: 39 %
MCH RBC QN AUTO: 29 PG (ref 26.6–33)
MCHC RBC AUTO-ENTMCNC: 33.3 G/DL (ref 31.5–35.7)
MCV RBC AUTO: 87 FL (ref 79–97)
MONOCYTES # BLD AUTO: 0.3 X10E3/UL (ref 0.1–0.9)
MONOCYTES NFR BLD AUTO: 7 %
NEUTROPHILS # BLD AUTO: 2 X10E3/UL (ref 1.4–7)
NEUTROPHILS NFR BLD AUTO: 52 %
PLATELET # BLD AUTO: 156 X10E3/UL (ref 150–450)
POTASSIUM SERPL-SCNC: 4 MMOL/L (ref 3.5–5.2)
PROT SERPL-MCNC: 7.5 G/DL (ref 6–8.5)
RBC # BLD AUTO: 4.96 X10E6/UL (ref 4.14–5.8)
SODIUM SERPL-SCNC: 144 MMOL/L (ref 134–144)
VALPROATE FREE SERPL-MCNC: 21.9 UG/ML (ref 6–22)
VALPROATE SERPL-MCNC: 104 UG/ML (ref 50–100)
WBC # BLD AUTO: 3.9 X10E3/UL (ref 3.4–10.8)

## 2021-12-16 DIAGNOSIS — R46.89 AGGRESSIVE BEHAVIOR: ICD-10-CM

## 2021-12-20 RX ORDER — DIAZEPAM 10 MG/1
TABLET ORAL
Qty: 30 TABLET | Refills: 1 | Status: SHIPPED | OUTPATIENT
Start: 2021-12-20 | End: 2022-10-19

## 2021-12-22 DIAGNOSIS — R46.89 AGGRESSIVE BEHAVIOR: ICD-10-CM

## 2021-12-22 DIAGNOSIS — R45.4 IRRITABLE MOOD: ICD-10-CM

## 2021-12-22 DIAGNOSIS — F84.0 ACTIVE AUTISTIC DISORDER: ICD-10-CM

## 2021-12-22 RX ORDER — LURASIDONE HYDROCHLORIDE 60 MG/1
TABLET, FILM COATED ORAL
Qty: 30 TABLET | Refills: 1 | Status: SHIPPED | OUTPATIENT
Start: 2021-12-22 | End: 2022-02-18

## 2022-01-10 DIAGNOSIS — F51.01 PRIMARY INSOMNIA: ICD-10-CM

## 2022-01-10 RX ORDER — ZOLPIDEM TARTRATE 10 MG/1
TABLET ORAL
Qty: 30 TABLET | Refills: 3 | Status: SHIPPED | OUTPATIENT
Start: 2022-01-10 | End: 2022-06-21

## 2022-01-15 DIAGNOSIS — R45.4 IRRITABLE MOOD: ICD-10-CM

## 2022-01-15 DIAGNOSIS — F84.0 ACTIVE AUTISTIC DISORDER: ICD-10-CM

## 2022-01-17 RX ORDER — RISPERIDONE 4 MG/1
TABLET ORAL
Qty: 30 TABLET | Refills: 5 | OUTPATIENT
Start: 2022-01-17

## 2022-02-18 DIAGNOSIS — R46.89 AGGRESSIVE BEHAVIOR: ICD-10-CM

## 2022-02-18 DIAGNOSIS — F84.0 ACTIVE AUTISTIC DISORDER: ICD-10-CM

## 2022-02-18 DIAGNOSIS — R45.4 IRRITABLE MOOD: ICD-10-CM

## 2022-02-18 RX ORDER — LURASIDONE HYDROCHLORIDE 60 MG/1
TABLET, FILM COATED ORAL
Qty: 30 TABLET | Refills: 1 | Status: SHIPPED | OUTPATIENT
Start: 2022-02-18 | End: 2023-01-31

## 2022-03-08 RX ORDER — CLONIDINE HYDROCHLORIDE 0.2 MG/1
TABLET ORAL
Qty: 90 TABLET | Refills: 0 | Status: SHIPPED | OUTPATIENT
Start: 2022-03-08 | End: 2023-01-31 | Stop reason: SDUPTHER

## 2022-06-13 DIAGNOSIS — F84.0 ACTIVE AUTISTIC DISORDER: ICD-10-CM

## 2022-06-13 DIAGNOSIS — R46.89 AGGRESSIVE BEHAVIOR: ICD-10-CM

## 2022-06-13 DIAGNOSIS — R45.4 IRRITABLE MOOD: ICD-10-CM

## 2022-06-13 RX ORDER — LURASIDONE HYDROCHLORIDE 120 MG/1
120 TABLET, FILM COATED ORAL DAILY
Qty: 30 TABLET | Refills: 0 | Status: SHIPPED | OUTPATIENT
Start: 2022-06-13 | End: 2023-01-23

## 2022-06-13 NOTE — TELEPHONE ENCOUNTER
Caller: Priti Fernadnez    Relationship: Mother    Best call back number: [phone]    Requested Prescriptions:   Requested Prescriptions     Pending Prescriptions Disp Refills   • Lurasidone HCl (Latuda) 120 MG tablet tablet 30 tablet 3     Sig: Take 1 tablet by mouth Daily.        Pharmacy where request should be sent: [facility] #[id number] - [address] AT [facility] [address]  - [phone] PH - [phone] FX     Additional details provided by patient: PATIENT IS OUT OF MEDICATION AND HAS BEEN FOR A FEW DAYS. IF A FULL REFILL IS NOT POSSIBLE THAN HIS MOTHER WOULD LIKE 5-7 DAYS UNTIL THEY CAN RETURN HOME     Does the patient have less than a 3 day supply:  [x] Yes  [] No    Vane Brody Rep   06/13/22 09:59 EDT

## 2022-06-19 DIAGNOSIS — F51.01 PRIMARY INSOMNIA: ICD-10-CM

## 2022-06-21 RX ORDER — TEMAZEPAM 30 MG/1
CAPSULE ORAL
Qty: 30 CAPSULE | Refills: 2 | Status: SHIPPED | OUTPATIENT
Start: 2022-06-21 | End: 2022-09-20

## 2022-06-21 RX ORDER — ZOLPIDEM TARTRATE 10 MG/1
TABLET ORAL
Qty: 30 TABLET | Refills: 2 | Status: SHIPPED | OUTPATIENT
Start: 2022-06-21 | End: 2022-09-20

## 2022-06-22 ENCOUNTER — TELEPHONE (OUTPATIENT)
Dept: FAMILY MEDICINE CLINIC | Facility: CLINIC | Age: 22
End: 2022-06-22

## 2022-06-24 ENCOUNTER — TELEPHONE (OUTPATIENT)
Dept: FAMILY MEDICINE CLINIC | Facility: CLINIC | Age: 22
End: 2022-06-24

## 2022-06-24 DIAGNOSIS — F84.0 ACTIVE AUTISTIC DISORDER: ICD-10-CM

## 2022-06-24 DIAGNOSIS — G40.909 NONINTRACTABLE EPILEPSY WITHOUT STATUS EPILEPTICUS, UNSPECIFIED EPILEPSY TYPE: ICD-10-CM

## 2022-06-24 RX ORDER — CLONAZEPAM 1 MG/1
1 TABLET, ORALLY DISINTEGRATING ORAL 3 TIMES DAILY PRN
Qty: 30 TABLET | Refills: 1 | Status: SHIPPED | OUTPATIENT
Start: 2022-06-24 | End: 2023-01-31 | Stop reason: SDUPTHER

## 2022-06-24 NOTE — TELEPHONE ENCOUNTER
Caller: Priti Fernandez    Relationship: Mother    Best call back number: [phone]    Requested Prescriptions:   Requested Prescriptions     Pending Prescriptions Disp Refills   • clonazePAM (KlonoPIN) 1 MG disintegrating tablet 30 tablet 2     Sig: Place 1 tablet on the tongue 3 (Three) Times a Day As Needed for Seizures.        Pharmacy where request should be sent: [facility] [address] [phone] [facility] [phone] FX     Additional details provided by patient: PATIENT HAS 2 DAY SUPPLY LEFT OF THE MEDICATION     Does the patient have less than a 3 day supply:  [x] Yes  [] No    Vane Almeida Rep   06/24/22 12:54 EDT

## 2022-07-18 ENCOUNTER — TELEPHONE (OUTPATIENT)
Dept: FAMILY MEDICINE CLINIC | Facility: CLINIC | Age: 22
End: 2022-07-18

## 2022-07-18 ENCOUNTER — OFFICE VISIT (OUTPATIENT)
Dept: FAMILY MEDICINE CLINIC | Facility: CLINIC | Age: 22
End: 2022-07-18

## 2022-07-18 VITALS — HEART RATE: 74 BPM | TEMPERATURE: 97.3 F | WEIGHT: 150 LBS | BODY MASS INDEX: 22.81 KG/M2

## 2022-07-18 DIAGNOSIS — R56.9 SEIZURE: ICD-10-CM

## 2022-07-18 DIAGNOSIS — R63.4 WEIGHT LOSS: ICD-10-CM

## 2022-07-18 DIAGNOSIS — F84.0 ACTIVE AUTISTIC DISORDER: Primary | ICD-10-CM

## 2022-07-18 DIAGNOSIS — L72.9 INFECTED CYST OF SKIN: ICD-10-CM

## 2022-07-18 DIAGNOSIS — L08.9 INFECTED CYST OF SKIN: ICD-10-CM

## 2022-07-18 DIAGNOSIS — R53.83 LETHARGY: ICD-10-CM

## 2022-07-18 DIAGNOSIS — R46.89 AGGRESSIVE BEHAVIOR: ICD-10-CM

## 2022-07-18 PROCEDURE — 99214 OFFICE O/P EST MOD 30 MIN: CPT | Performed by: FAMILY MEDICINE

## 2022-07-18 RX ORDER — SULFAMETHOXAZOLE AND TRIMETHOPRIM 800; 160 MG/1; MG/1
1 TABLET ORAL 2 TIMES DAILY
Qty: 14 TABLET | Refills: 0 | Status: SHIPPED | OUTPATIENT
Start: 2022-07-18 | End: 2023-01-20

## 2022-07-18 NOTE — TELEPHONE ENCOUNTER
Caller: Priti Fernandez    Relationship to patient: Mother    Best call back number:     Chief complaint: FATIGUE, NOT WANTING TO GET OUT OF BED FOR A WEEK, NOT EATING, NO APPETIITE, LOSING WEIGHT    Type of visit: OFFICE VISIT    Requested date: AS SOON AS POSSIBLE      Additional notes:DR JIMENEZ DIDN'T HAVE ANY OPENINGS UNTIL [id number]

## 2022-07-18 NOTE — PROGRESS NOTES
Subjective   Robert Fernandez is a 21 y.o. male.     History of Present Illness     They forgot his latuda 60 mgabout 4 weeks ago and was doing great without it  Then last week poor sleep and poor appetite and poor energy  He is picking on his hair          Review of Systems   Constitutional: Negative.        Objective   Physical Exam  Vitals and nursing note reviewed.   Constitutional:       General: He is not in acute distress.     Appearance: Normal appearance. He is well-developed.   HENT:      Right Ear: Tympanic membrane, ear canal and external ear normal.      Left Ear: Tympanic membrane, ear canal and external ear normal.   Cardiovascular:      Rate and Rhythm: Normal rate and regular rhythm.      Heart sounds: Normal heart sounds.   Pulmonary:      Effort: Pulmonary effort is normal.      Breath sounds: Normal breath sounds.   Musculoskeletal:        Hands:    Neurological:      Mental Status: He is alert and oriented to person, place, and time.   Psychiatric:      Comments: Nonverbal, does not like ears looked at         Assessment & Plan   Diagnoses and all orders for this visit:    1. Active autistic disorder (Primary)    2. Aggressive behavior    3. Weight loss  -     CBC & Differential  -     Comprehensive Metabolic Panel  -     TSH+Free T4    4. Lethargy  -     CBC & Differential  -     Comprehensive Metabolic Panel  -     TSH+Free T4    5. Infected cyst of skin  -     sulfamethoxazole-trimethoprim (Bactrim DS) 800-160 MG per tablet; Take 1 tablet by mouth 2 (Two) Times a Day.  Dispense: 14 tablet; Refill: 0    6. Seizure (HCC)  -     Valproic Acid Level, Total      Seems that he was better without latuda 60 mg for the last 3 weeks but then this past week, behavior changes with more lethargy noted.  F/u pending labs, but I am not sure why he would be more lethargic at this time.  bactrim for infected cyst. family to call back iNB    Will check depakote level, no change in regimen for quite some toime and  he continues to be seizure free

## 2022-07-19 LAB
ALBUMIN SERPL-MCNC: 5 G/DL (ref 4.1–5.2)
ALBUMIN/GLOB SERPL: 2 {RATIO} (ref 1.2–2.2)
ALP SERPL-CCNC: 87 IU/L (ref 44–121)
ALT SERPL-CCNC: 50 IU/L (ref 0–44)
AST SERPL-CCNC: 36 IU/L (ref 0–40)
BASOPHILS # BLD AUTO: 0 X10E3/UL (ref 0–0.2)
BASOPHILS NFR BLD AUTO: 1 %
BILIRUB SERPL-MCNC: 0.8 MG/DL (ref 0–1.2)
BUN SERPL-MCNC: 17 MG/DL (ref 6–20)
BUN/CREAT SERPL: 18 (ref 9–20)
CALCIUM SERPL-MCNC: 10.3 MG/DL (ref 8.7–10.2)
CHLORIDE SERPL-SCNC: 103 MMOL/L (ref 96–106)
CO2 SERPL-SCNC: 27 MMOL/L (ref 20–29)
CREAT SERPL-MCNC: 0.97 MG/DL (ref 0.76–1.27)
EGFRCR SERPLBLD CKD-EPI 2021: 112 ML/MIN/1.73
EOSINOPHIL # BLD AUTO: 0.1 X10E3/UL (ref 0–0.4)
EOSINOPHIL NFR BLD AUTO: 2 %
ERYTHROCYTE [DISTWIDTH] IN BLOOD BY AUTOMATED COUNT: 13.7 % (ref 11.6–15.4)
GLOBULIN SER CALC-MCNC: 2.5 G/DL (ref 1.5–4.5)
GLUCOSE SERPL-MCNC: 94 MG/DL (ref 65–99)
HCT VFR BLD AUTO: 46.4 % (ref 37.5–51)
HGB BLD-MCNC: 15.8 G/DL (ref 13–17.7)
IMM GRANULOCYTES # BLD AUTO: 0 X10E3/UL (ref 0–0.1)
IMM GRANULOCYTES NFR BLD AUTO: 0 %
LYMPHOCYTES # BLD AUTO: 1.3 X10E3/UL (ref 0.7–3.1)
LYMPHOCYTES NFR BLD AUTO: 33 %
Lab: NORMAL
MCH RBC QN AUTO: 29.4 PG (ref 26.6–33)
MCHC RBC AUTO-ENTMCNC: 34.1 G/DL (ref 31.5–35.7)
MCV RBC AUTO: 86 FL (ref 79–97)
MONOCYTES # BLD AUTO: 0.5 X10E3/UL (ref 0.1–0.9)
MONOCYTES NFR BLD AUTO: 11 %
NEUTROPHILS # BLD AUTO: 2.1 X10E3/UL (ref 1.4–7)
NEUTROPHILS NFR BLD AUTO: 53 %
PLATELET # BLD AUTO: 145 X10E3/UL (ref 150–450)
POTASSIUM SERPL-SCNC: 5 MMOL/L (ref 3.5–5.2)
PROT SERPL-MCNC: 7.5 G/DL (ref 6–8.5)
RBC # BLD AUTO: 5.38 X10E6/UL (ref 4.14–5.8)
SODIUM SERPL-SCNC: 142 MMOL/L (ref 134–144)
T4 FREE SERPL-MCNC: 1.01 NG/DL (ref 0.82–1.77)
TSH SERPL DL<=0.005 MIU/L-ACNC: 0.41 UIU/ML (ref 0.45–4.5)
VALPROATE SERPL-MCNC: 97 UG/ML (ref 50–100)
WBC # BLD AUTO: 4 X10E3/UL (ref 3.4–10.8)

## 2022-08-07 DIAGNOSIS — R46.89 AGGRESSIVE BEHAVIOR: ICD-10-CM

## 2022-08-09 RX ORDER — QUETIAPINE FUMARATE 50 MG/1
TABLET, FILM COATED ORAL
Qty: 60 TABLET | Refills: 5 | Status: SHIPPED | OUTPATIENT
Start: 2022-08-09 | End: 2023-03-28

## 2022-08-19 DIAGNOSIS — F51.01 PRIMARY INSOMNIA: ICD-10-CM

## 2022-08-22 RX ORDER — CYCLOBENZAPRINE HCL 10 MG
TABLET ORAL
Qty: 30 TABLET | Refills: 5 | Status: SHIPPED | OUTPATIENT
Start: 2022-08-22 | End: 2023-04-04

## 2022-09-18 DIAGNOSIS — F51.01 PRIMARY INSOMNIA: ICD-10-CM

## 2022-09-20 RX ORDER — ZOLPIDEM TARTRATE 10 MG/1
TABLET ORAL
Qty: 30 TABLET | Refills: 3 | Status: SHIPPED | OUTPATIENT
Start: 2022-09-20 | End: 2023-01-23

## 2022-09-20 RX ORDER — TEMAZEPAM 30 MG/1
CAPSULE ORAL
Qty: 30 CAPSULE | Refills: 3 | Status: SHIPPED | OUTPATIENT
Start: 2022-09-20 | End: 2023-01-23

## 2022-10-18 DIAGNOSIS — R46.89 AGGRESSIVE BEHAVIOR: ICD-10-CM

## 2022-10-19 RX ORDER — DIAZEPAM 10 MG/1
TABLET ORAL
Qty: 30 TABLET | Refills: 2 | Status: SHIPPED | OUTPATIENT
Start: 2022-10-19

## 2023-01-20 ENCOUNTER — TELEPHONE (OUTPATIENT)
Dept: FAMILY MEDICINE CLINIC | Facility: CLINIC | Age: 23
End: 2023-01-20
Payer: COMMERCIAL

## 2023-01-20 DIAGNOSIS — R45.4 IRRITABLE MOOD: ICD-10-CM

## 2023-01-20 DIAGNOSIS — F84.0 ACTIVE AUTISTIC DISORDER: ICD-10-CM

## 2023-01-20 RX ORDER — RISPERIDONE 4 MG/1
TABLET ORAL
Qty: 30 TABLET | Refills: 5 | OUTPATIENT
Start: 2023-01-20

## 2023-01-20 NOTE — TELEPHONE ENCOUNTER
Lets confirm what medicines he is taking.  I thought we stopped this a couple years ago!    Please confirm all meds he takes and how using them.  Thanks    May need an appointment, last seen July 22!

## 2023-01-20 NOTE — TELEPHONE ENCOUNTER
Caller: Priti Fernandez    Relationship: Mother    Best call back number: [phone]    Requested Prescriptions:    risperiDONE (risperDAL) 4 MG tablet         Pharmacy where request should be sent: [facility] [id number] [address] [phone]  - [phone]      Additional details provided by patient: OUT OF MEDICATION     Does the patient have less than a 3 day supply:  [x] Yes  [] No    Would you like a call back once the refill request has been completed: [x] Yes [] No    If the office needs to give you a call back, can they leave a voicemail: [x] Yes [] No    Vane Corley Rep   01/20/23 11:11 EST

## 2023-01-22 DIAGNOSIS — R46.89 AGGRESSIVE BEHAVIOR: ICD-10-CM

## 2023-01-22 DIAGNOSIS — F84.0 ACTIVE AUTISTIC DISORDER: ICD-10-CM

## 2023-01-22 DIAGNOSIS — F51.01 PRIMARY INSOMNIA: ICD-10-CM

## 2023-01-22 DIAGNOSIS — R45.4 IRRITABLE MOOD: ICD-10-CM

## 2023-01-23 RX ORDER — LURASIDONE HYDROCHLORIDE 120 MG/1
TABLET, FILM COATED ORAL
Qty: 30 TABLET | Refills: 0 | Status: SHIPPED | OUTPATIENT
Start: 2023-01-23

## 2023-01-23 RX ORDER — RISPERIDONE 4 MG/1
4 TABLET ORAL DAILY
Qty: 30 TABLET | Refills: 5 | Status: SHIPPED | OUTPATIENT
Start: 2023-01-23

## 2023-01-23 RX ORDER — ZOLPIDEM TARTRATE 10 MG/1
TABLET ORAL
Qty: 30 TABLET | Refills: 0 | Status: SHIPPED | OUTPATIENT
Start: 2023-01-23 | End: 2023-03-01

## 2023-01-23 RX ORDER — TEMAZEPAM 30 MG/1
CAPSULE ORAL
Qty: 30 CAPSULE | Refills: 0 | Status: SHIPPED | OUTPATIENT
Start: 2023-01-23 | End: 2023-01-31 | Stop reason: SDUPTHER

## 2023-01-23 NOTE — TELEPHONE ENCOUNTER
Priti ( Mother) states They need to switch him ever so often on medication because after a while meds stop working. They switch back and forth wit risperidone and Latuda.   Current meds are;  Klonopin  Flexeril  Ambien  Seroquel  Diazepam  In stead of most resent  Latuda they want to go back to Risperidone.

## 2023-01-23 NOTE — TELEPHONE ENCOUNTER
Ok, still think appointment would be a good idea!  risperdal refilled.      If they do not want to bring him in, I could just talk with mom at time of appointment to get update in our system.

## 2023-01-31 ENCOUNTER — OFFICE VISIT (OUTPATIENT)
Dept: FAMILY MEDICINE CLINIC | Facility: CLINIC | Age: 23
End: 2023-01-31
Payer: COMMERCIAL

## 2023-01-31 DIAGNOSIS — F84.0 ACTIVE AUTISTIC DISORDER: ICD-10-CM

## 2023-01-31 DIAGNOSIS — F51.01 PRIMARY INSOMNIA: ICD-10-CM

## 2023-01-31 DIAGNOSIS — G40.909 NONINTRACTABLE EPILEPSY WITHOUT STATUS EPILEPTICUS, UNSPECIFIED EPILEPSY TYPE: ICD-10-CM

## 2023-01-31 PROCEDURE — 99442 PR PHYS/QHP TELEPHONE EVALUATION 11-20 MIN: CPT | Performed by: FAMILY MEDICINE

## 2023-01-31 RX ORDER — TEMAZEPAM 30 MG/1
30 CAPSULE ORAL NIGHTLY PRN
Qty: 30 CAPSULE | Refills: 3 | Status: SHIPPED | OUTPATIENT
Start: 2023-01-31 | End: 2023-03-03 | Stop reason: SDUPTHER

## 2023-01-31 RX ORDER — CLONAZEPAM 1 MG/1
1 TABLET, ORALLY DISINTEGRATING ORAL 3 TIMES DAILY PRN
Qty: 30 TABLET | Refills: 1 | Status: SHIPPED | OUTPATIENT
Start: 2023-01-31

## 2023-01-31 RX ORDER — CLONIDINE HYDROCHLORIDE 0.2 MG/1
0.2 TABLET ORAL NIGHTLY
Qty: 90 TABLET | Refills: 0 | Status: SHIPPED | OUTPATIENT
Start: 2023-01-31 | End: 2023-03-03 | Stop reason: SDUPTHER

## 2023-01-31 NOTE — PROGRESS NOTES
Subjective   Robert Fernandez is a 22 y.o. male.     History of Present Illness     You have chosen to receive care through a telephone visit. Do you consent to use a telephone visit for your medical care today? Yes  Mom of patient at home and I am in my office          He is currently taking risperdal recently for his violent behavior and acting out (stopped latuda at this time)  Was manic and not sleeping well and more aggressive  We simply resumed 4 mg of risperdal and this did help his behavior a little  Sleeping better right now and currently using restoril but they will change medicine up  On occasion may use clonazepam in addition    The following portions of the patient's history were reviewed and updated as appropriate: allergies, current medications, past family history, past medical history, past social history, past surgical history and problem list.    Review of Systems   Constitutional: Negative.    Psychiatric/Behavioral: Negative.        Objective   Physical Exam  Vitals and nursing note reviewed.   Constitutional:       General: He is not in acute distress.     Appearance: Normal appearance. He is well-developed.   Cardiovascular:      Rate and Rhythm: Normal rate and regular rhythm.      Heart sounds: Normal heart sounds.   Pulmonary:      Effort: Pulmonary effort is normal.      Breath sounds: Normal breath sounds.   Neurological:      Mental Status: He is alert and oriented to person, place, and time.   Psychiatric:         Mood and Affect: Mood normal.         Behavior: Behavior normal.         Thought Content: Thought content normal.         Judgment: Judgment normal.        Pt not in office at this time    Assessment & Plan   Diagnoses and all orders for this visit:    1. Primary insomnia  -     cloNIDine (CATAPRES) 0.2 MG tablet; Take 1 tablet by mouth Every Night.  Dispense: 90 tablet; Refill: 0  -     temazepam (RESTORIL) 30 MG capsule; Take 1 capsule by mouth At Night As Needed for Sleep.   Dispense: 30 capsule; Refill: 3    2. Active autistic disorder  -     cloNIDine (CATAPRES) 0.2 MG tablet; Take 1 tablet by mouth Every Night.  Dispense: 90 tablet; Refill: 0  -     clonazePAM (KlonoPIN) 1 MG disintegrating tablet; Place 1 tablet on the tongue 3 (Three) Times a Day As Needed for Seizures.  Dispense: 30 tablet; Refill: 1    3. Nonintractable epilepsy without status epilepticus, unspecified epilepsy type (HCC)  -     clonazePAM (KlonoPIN) 1 MG disintegrating tablet; Place 1 tablet on the tongue 3 (Three) Times a Day As Needed for Seizures.  Dispense: 30 tablet; Refill: 1    we did review his meds and discussed their usage.  Family adjusts medicines deepening on how mika is responding to them and has always done so in a very careful manner.    He continues to have mood issues that are difficult to control at time, but has not had major issues like we have had in the past.  Reviewed and discussed med options.  Refills sent in    Total time on phone and discussing patient was 15-17 minutes.

## 2023-02-23 DIAGNOSIS — F51.01 PRIMARY INSOMNIA: ICD-10-CM

## 2023-03-01 RX ORDER — ZOLPIDEM TARTRATE 10 MG/1
TABLET ORAL
Qty: 30 TABLET | Refills: 3 | Status: SHIPPED | OUTPATIENT
Start: 2023-03-01

## 2023-03-03 ENCOUNTER — TELEPHONE (OUTPATIENT)
Dept: FAMILY MEDICINE CLINIC | Facility: CLINIC | Age: 23
End: 2023-03-03
Payer: COMMERCIAL

## 2023-03-03 DIAGNOSIS — F51.01 PRIMARY INSOMNIA: ICD-10-CM

## 2023-03-03 DIAGNOSIS — F84.0 ACTIVE AUTISTIC DISORDER: ICD-10-CM

## 2023-03-03 RX ORDER — CLONIDINE HYDROCHLORIDE 0.2 MG/1
0.2 TABLET ORAL NIGHTLY
Qty: 90 TABLET | Refills: 1 | Status: SHIPPED | OUTPATIENT
Start: 2023-03-03

## 2023-03-03 RX ORDER — TEMAZEPAM 30 MG/1
30 CAPSULE ORAL NIGHTLY PRN
Qty: 30 CAPSULE | Refills: 3 | Status: SHIPPED | OUTPATIENT
Start: 2023-03-03

## 2023-03-03 NOTE — TELEPHONE ENCOUNTER
Caller: Priti Fernandez    Relationship: Mother    Best call back number: [phone]  --  Requested Prescriptions:   Requested Prescriptions     Pending Prescriptions Disp Refills   • temazepam (RESTORIL) 30 MG capsule 30 capsule 3     Sig: Take 1 capsule by mouth At Night As Needed for Sleep.   • cloNIDine (CATAPRES) 0.2 MG tablet 90 tablet 0     Sig: Take 1 tablet by mouth Every Night.        Pharmacy where request should be sent: [facility] [id number] [address] [phone] [facility] [phone]      Additional details provided by patient:     Does the patient have less than a 3 day supply:  [x] Yes  [] No    Vane Engel Rep   03/03/23 14:15 EST

## 2023-03-03 NOTE — TELEPHONE ENCOUNTER
Caller: Priti Fernandez    Relationship: Mother    Best call back number: [phone]    Requested Prescriptions:   Requested Prescriptions     Pending Prescriptions Disp Refills   • temazepam (RESTORIL) 30 MG capsule 30 capsule 3     Sig: Take 1 capsule by mouth At Night As Needed for Sleep.   • zolpidem (AMBIEN) 10 MG tablet 30 tablet 3     Sig: Take 1 tablet by mouth every night at bedtime.        Pharmacy where request should be sent:  [facility] [id number] [address] [phone] [facility] [phone]     Additional details provided by patient: PLEASE REFILL OR CALL TO ADVISE.     Does the patient have less than a 3 day supply:  [x] Yes  [] No COMPLETELY OUT    Would you like a call back once the refill request has been completed: [x] Yes [] No    If the office needs to give you a call back, can they leave a voicemail: [x] Yes [] No    Vane Salinas Rep   03/03/23 12:31 EST     THANK YOU.

## 2023-03-28 DIAGNOSIS — R46.89 AGGRESSIVE BEHAVIOR: ICD-10-CM

## 2023-03-28 RX ORDER — QUETIAPINE FUMARATE 50 MG/1
TABLET, FILM COATED ORAL
Qty: 60 TABLET | Refills: 5 | Status: SHIPPED | OUTPATIENT
Start: 2023-03-28

## 2023-04-03 DIAGNOSIS — F51.01 PRIMARY INSOMNIA: ICD-10-CM

## 2023-04-04 RX ORDER — CYCLOBENZAPRINE HCL 10 MG
TABLET ORAL
Qty: 30 TABLET | Refills: 5 | Status: SHIPPED | OUTPATIENT
Start: 2023-04-04

## 2023-05-04 DIAGNOSIS — R46.89 AGGRESSIVE BEHAVIOR: ICD-10-CM

## 2023-05-05 RX ORDER — DIAZEPAM 10 MG/1
TABLET ORAL
Qty: 30 TABLET | Refills: 1 | Status: SHIPPED | OUTPATIENT
Start: 2023-05-05

## 2023-07-25 ENCOUNTER — OFFICE VISIT (OUTPATIENT)
Dept: FAMILY MEDICINE CLINIC | Facility: CLINIC | Age: 23
End: 2023-07-25
Payer: COMMERCIAL

## 2023-07-25 VITALS
RESPIRATION RATE: 18 BRPM | HEIGHT: 68 IN | BODY MASS INDEX: 23.34 KG/M2 | WEIGHT: 154 LBS | SYSTOLIC BLOOD PRESSURE: 116 MMHG | TEMPERATURE: 98.7 F | OXYGEN SATURATION: 96 % | HEART RATE: 81 BPM | DIASTOLIC BLOOD PRESSURE: 80 MMHG

## 2023-07-25 DIAGNOSIS — B96.89 BACTERIAL CONJUNCTIVITIS OF BOTH EYES: Primary | ICD-10-CM

## 2023-07-25 DIAGNOSIS — H10.9 BACTERIAL CONJUNCTIVITIS OF BOTH EYES: Primary | ICD-10-CM

## 2023-07-25 PROCEDURE — 99213 OFFICE O/P EST LOW 20 MIN: CPT

## 2023-07-25 RX ORDER — POLYMYXIN B SULFATE AND TRIMETHOPRIM 1; 10000 MG/ML; [USP'U]/ML
1 SOLUTION OPHTHALMIC EVERY 4 HOURS
Qty: 10 ML | Refills: 0 | Status: SHIPPED | OUTPATIENT
Start: 2023-07-25

## 2023-07-25 RX ORDER — DIVALPROEX SODIUM 500 MG/1
500 TABLET, DELAYED RELEASE ORAL 3 TIMES DAILY
Qty: 90 TABLET | Refills: 11 | COMMUNITY
Start: 2023-03-22 | End: 2024-03-21

## 2023-07-25 NOTE — PROGRESS NOTES
"Chief Complaint   Patient presents with    Conjunctivitis     Started yesterday swelling and runny and red       Subjective      Robert Fernandez is a 22 y.o. who presents for bilateral eye redness and drainage. Symptoms started with left eye yesterday and then this morning woke up with right eye. No other symptoms. No one else has symptoms in the home.  No fever or chills.        Review of Systems   Constitutional:  Negative for chills and fever.   HENT:  Negative for congestion, rhinorrhea and sneezing.    Eyes:  Positive for pain and redness.   Respiratory:  Negative for cough and shortness of breath.       Objective   Vital Signs:  /80   Pulse 81   Temp 98.7 °F (37.1 °C)   Resp 18   Ht 172.7 cm (68\")   Wt 69.9 kg (154 lb)   SpO2 96%   BMI 23.42 kg/m²     Physical Exam  Vitals and nursing note reviewed.   Constitutional:       Appearance: Normal appearance.   HENT:      Right Ear: Tympanic membrane, ear canal and external ear normal.      Left Ear: Tympanic membrane, ear canal and external ear normal.   Eyes:      General: Lids are normal.         Left eye: Discharge (greenish) present.     Conjunctiva/sclera:      Right eye: Right conjunctiva is not injected.      Left eye: Left conjunctiva is injected. Exudate present.   Cardiovascular:      Rate and Rhythm: Normal rate and regular rhythm.      Heart sounds: Normal heart sounds.   Pulmonary:      Breath sounds: Normal breath sounds.   Neurological:      Mental Status: He is alert.        Result Review                     Assessment and Plan  Diagnoses and all orders for this visit:    1. Bacterial conjunctivitis of both eyes (Primary)  -     trimethoprim-polymyxin b (Polytrim) 69764-0.1 UNIT/ML-% ophthalmic solution; Administer 1 drop to both eyes Every 4 (Four) Hours.  Dispense: 10 mL; Refill: 0      Will treat for bacterial conjunctivitis with antibiotic drops.  Provided instructions to patient's mother. Follow up if no improvement or worsening in 5-7 " days.       Patient Instructions   Medication as prescribed  Change pillow case after 24 hours of antibiotics  Sanitize anything that touches face / eyes  Follow up as needed    Discussed medications prescribed and OTC medications recommended.  Discussed medication safety, possible side effects and how to take or administer medications. Instructed patient to report any adverse reactions, side effects or concerns.     Reviewed physical exam findings and plan with patient who verbalized understanding and agrees with plan of care. Patient was given opportunity to ask questions and all concerns were addressed prior to the conclusion of today's visit.           Follow Up  No follow-ups on file.  Patient was given instructions and counseling regarding his condition or for health maintenance advice. Please see specific information pulled into the AVS if appropriate.

## 2023-07-25 NOTE — PATIENT INSTRUCTIONS
Medication as prescribed  Change pillow case after 24 hours of antibiotics  Sanitize anything that touches face / eyes  Follow up as needed

## 2023-07-28 ENCOUNTER — TELEPHONE (OUTPATIENT)
Dept: FAMILY MEDICINE CLINIC | Facility: CLINIC | Age: 23
End: 2023-07-28
Payer: COMMERCIAL

## 2023-07-28 DIAGNOSIS — B96.89 BACTERIAL CONJUNCTIVITIS OF BOTH EYES: Primary | ICD-10-CM

## 2023-07-28 DIAGNOSIS — J06.9 UPPER RESPIRATORY TRACT INFECTION, UNSPECIFIED TYPE: ICD-10-CM

## 2023-07-28 DIAGNOSIS — H10.9 BACTERIAL CONJUNCTIVITIS OF BOTH EYES: Primary | ICD-10-CM

## 2023-07-28 RX ORDER — AMOXICILLIN AND CLAVULANATE POTASSIUM 875; 125 MG/1; MG/1
1 TABLET, FILM COATED ORAL 2 TIMES DAILY
Qty: 14 TABLET | Refills: 0 | Status: SHIPPED | OUTPATIENT
Start: 2023-07-28

## 2023-07-28 NOTE — TELEPHONE ENCOUNTER
Caller: Priti Fernandez    Relationship to patient: Mother    Best call back number: [phone]     Patient is needing: PATIENTS MOTHER STATED HER SON HAD SEEN DOCTOR REYNOLD ON THE 25TH FOR PINK E[facility].  STATED HE NOW HAS A CROUPY COUGH, VERY CONGESTED AND FEVER SINCE YESTERDAY.    REQUESTING IF DOCTOR REYNOLD CAN PRESCRIBE AN ANTIBIOTIC    DECLINED TO SCHEDULE APPOINTMENT      [facility] [id number] [address] [phone] [facility] [phone]  [phone]

## 2023-07-28 NOTE — TELEPHONE ENCOUNTER
Antibiotics sent to pharmacy.  Spoke with patient's mother and notified. Advised to follow up if worsening or no improvement.

## 2023-08-06 DIAGNOSIS — R45.4 IRRITABLE MOOD: ICD-10-CM

## 2023-08-06 DIAGNOSIS — F84.0 ACTIVE AUTISTIC DISORDER: ICD-10-CM

## 2023-08-08 RX ORDER — RISPERIDONE 4 MG/1
TABLET ORAL
Qty: 30 TABLET | Refills: 5 | Status: SHIPPED | OUTPATIENT
Start: 2023-08-08

## 2023-08-09 DIAGNOSIS — F51.01 PRIMARY INSOMNIA: ICD-10-CM

## 2023-08-11 RX ORDER — TEMAZEPAM 30 MG/1
CAPSULE ORAL
Qty: 30 CAPSULE | Refills: 1 | Status: SHIPPED | OUTPATIENT
Start: 2023-08-11

## 2023-08-11 RX ORDER — ZOLPIDEM TARTRATE 10 MG/1
TABLET ORAL
Qty: 30 TABLET | Refills: 1 | Status: SHIPPED | OUTPATIENT
Start: 2023-08-11

## 2023-10-09 DIAGNOSIS — R46.89 AGGRESSIVE BEHAVIOR: ICD-10-CM

## 2023-10-09 DIAGNOSIS — F51.01 PRIMARY INSOMNIA: ICD-10-CM

## 2023-10-09 RX ORDER — QUETIAPINE FUMARATE 50 MG/1
TABLET, FILM COATED ORAL
Qty: 60 TABLET | Refills: 5 | Status: SHIPPED | OUTPATIENT
Start: 2023-10-09

## 2023-10-10 RX ORDER — TEMAZEPAM 30 MG/1
30 CAPSULE ORAL NIGHTLY PRN
Qty: 30 CAPSULE | Refills: 2 | Status: SHIPPED | OUTPATIENT
Start: 2023-10-10

## 2023-10-10 RX ORDER — ZOLPIDEM TARTRATE 10 MG/1
TABLET ORAL
Qty: 30 TABLET | Refills: 2 | Status: SHIPPED | OUTPATIENT
Start: 2023-10-10

## 2023-10-23 DIAGNOSIS — F84.0 ACTIVE AUTISTIC DISORDER: ICD-10-CM

## 2023-10-23 DIAGNOSIS — F51.01 PRIMARY INSOMNIA: ICD-10-CM

## 2023-10-23 RX ORDER — CLONIDINE HYDROCHLORIDE 0.2 MG/1
0.2 TABLET ORAL NIGHTLY
Qty: 90 TABLET | Refills: 1 | Status: SHIPPED | OUTPATIENT
Start: 2023-10-23

## 2023-10-24 NOTE — TELEPHONE ENCOUNTER
Caller: [facility] [address], [place] - [address] [facility]  [phone] [facility] [phone]     Relationship: Pharmacy    Best call back number:[phone]    What medications are you currently taking:   Current Outpatient Medications on File Prior to Visit   Medication Sig Dispense Refill   • clonazePAM (KlonoPIN) 1 MG disintegrating tablet Place 1 tablet on the tongue 3 (Three) Times a Day As Needed for Seizures. 30 tablet 2   • cloNIDine (CATAPRES) 0.2 MG tablet TAKE ONE TABLET BY MOUTH ONCE NIGHTLY AS NEEDED FOR INSOMNIA 90 tablet 0   • cyclobenzaprine (FLEXERIL) 10 MG tablet 1 PO QHS 30 tablet 5   • diazepam (DIASTAT ACUDIAL) 10 MG rectal kit Use as directed.     • diazePAM (VALIUM) 10 MG tablet TAKE 1/2 TO 1 TABLET BY MOUTH TAKE TABLET BY MOUTH ONCE NIGHTLY AS NEEDED 30 tablet 1   • diphenoxylate-atropine (LOMOTIL) 2.5-0.025 MG per tablet TAKE ONE TO TWO TABLETS BY MOUTH WITH EACH LOOSE STOOL *MAX OF 8 TABLETS DAILY 60 tablet 2   • Lactobacillus Reuteri (BIOGAIA PROBIOTIC) liquid Standard dose daily 1 bottle 2   • Latuda 60 MG tablet tablet TAKE ONE TABLET BY MOUTH DAILY 30 tablet 1   • Lurasidone HCl (Latuda) 120 MG tablet tablet Take 1 tablet by mouth Daily. 30 tablet 0   • QUEtiapine (SEROquel) 50 MG tablet TAKE ONE TABLET BY MOUTH TWICE A DAY AS NEEDED 60 tablet 5   • temazepam (RESTORIL) 30 MG capsule TAKE ONE CAPSULE BY MOUTH ONCE NIGHTLY AS NEEDED FOR SLEEP 30 capsule 2   • valproic acid (DEPAKENE) 250 MG capsule Take 2 capsules by mouth 2 (two) times a day.     • zolpidem (AMBIEN) 10 MG tablet TAKE ONE TABLET BY MOUTH EVERY NIGHT AT BEDTIME 30 tablet 2     No current facility-administered medications on file prior to visit.          When did you start taking these medications:     Which medication are you concerned about:temazepam (RESTORIL) 30 MG capsule     Who prescribed you this medication: DR JIMENEZ    What are your concerns: PATIENT IS ALSO TAKING AMBIEN  AND WAS ASKING ABOUT THIS MEDICATION  ; PLEASE CALL TO ADVISE                   [place] all pertinent systems normal

## 2023-12-05 ENCOUNTER — OFFICE VISIT (OUTPATIENT)
Dept: FAMILY MEDICINE CLINIC | Facility: CLINIC | Age: 23
End: 2023-12-05
Payer: COMMERCIAL

## 2023-12-05 VITALS
HEIGHT: 68 IN | SYSTOLIC BLOOD PRESSURE: 126 MMHG | OXYGEN SATURATION: 97 % | TEMPERATURE: 96.9 F | WEIGHT: 144.4 LBS | DIASTOLIC BLOOD PRESSURE: 84 MMHG | BODY MASS INDEX: 21.89 KG/M2 | HEART RATE: 118 BPM

## 2023-12-05 DIAGNOSIS — R45.4 IRRITABLE MOOD: ICD-10-CM

## 2023-12-05 DIAGNOSIS — H65.91 RIGHT OTITIS MEDIA WITH EFFUSION: ICD-10-CM

## 2023-12-05 DIAGNOSIS — R19.7 DIARRHEA, UNSPECIFIED TYPE: ICD-10-CM

## 2023-12-05 DIAGNOSIS — Z00.00 ANNUAL PHYSICAL EXAM: Primary | ICD-10-CM

## 2023-12-05 DIAGNOSIS — F84.0 ACTIVE AUTISTIC DISORDER: ICD-10-CM

## 2023-12-05 DIAGNOSIS — G40.909 NONINTRACTABLE EPILEPSY WITHOUT STATUS EPILEPTICUS, UNSPECIFIED EPILEPSY TYPE: ICD-10-CM

## 2023-12-05 RX ORDER — MIDAZOLAM 5 MG/.1ML
SPRAY NASAL AS NEEDED
COMMUNITY

## 2023-12-05 RX ORDER — CEFDINIR 300 MG/1
300 CAPSULE ORAL 2 TIMES DAILY
Qty: 14 CAPSULE | Refills: 0 | Status: SHIPPED | OUTPATIENT
Start: 2023-12-05 | End: 2023-12-12

## 2023-12-05 RX ORDER — CEFDINIR 300 MG/1
300 CAPSULE ORAL 2 TIMES DAILY
Qty: 14 CAPSULE | Refills: 0 | Status: SHIPPED | OUTPATIENT
Start: 2023-12-05 | End: 2023-12-05 | Stop reason: SDUPTHER

## 2023-12-05 RX ORDER — SODIUM FLUORIDE AND POTASSIUM NITRATE 5.8; 57.5 MG/ML; MG/ML
GEL, DENTIFRICE DENTAL DAILY
COMMUNITY
Start: 2023-10-10

## 2023-12-05 NOTE — PROGRESS NOTES
Patient Name: Robert Fernandez  : 2000   MRN: [id number]     Chief Complaint:    Chief Complaint   Patient presents with    Annual Exam       History of Present Illness: Robert Fernandez is a 23 y.o. male who is here today for their annual health maintenance and physical. Annual physical for Su Trejo. He is cared for by mother and father . Mother is concerned about Decreased appetite, he has been in a manic phase for about the last 2 weeks, but was started back on risperidone yesterday and moods have improved today. Mother is concerned about recent weight loss due to manic episode. He follows Dr Dailey and neurology for medication management. No major changes in life or diet to cause manic episode. He is Nonverbal. Mother and father decline vaccines. Otherwise, UTD on preventative care. Mother is concerned about frequent diarrhea episodes. He was dx with C. Diff in 2017 and mother would like to have him rechecked for C. Diff today. He has not been on any recent antibiotics. He also suffers from chronic ear infections, and he is reportedly been having right ear pain in the last couple of days as well.   Recent Dental cleaning.           Review of Systems:   Review of Systems   Constitutional:  Positive for unexpected weight loss. Negative for fatigue.   HENT:  Negative for congestion, dental problem, ear pain, hearing loss, trouble swallowing and voice change.    Eyes:  Negative for blurred vision and visual disturbance.   Respiratory:  Negative for apnea, cough, chest tightness, shortness of breath and wheezing.    Cardiovascular:  Negative for chest pain, palpitations and leg swelling.   Gastrointestinal:  Positive for diarrhea. Negative for abdominal pain, constipation, nausea and GERD.   Genitourinary:  Negative for dysuria, frequency, hematuria and urinary incontinence.   Musculoskeletal:  Negative for arthralgias, back pain, gait problem, joint swelling, myalgias and neck pain.   Skin:   Negative for dry skin, rash, skin lesions, wound and bruise.   Neurological:  Negative for dizziness, seizures, speech difficulty, weakness, headache, memory problem and confusion.   Psychiatric/Behavioral:  Negative for agitation, behavioral problems, sleep disturbance, depressed mood and stress. The patient is not nervous/anxious.        Past Medical History, Social History, Family History and Care Team were all reviewed with patient and updated as appropriate.     Medications:     Current Outpatient Medications:     cefdinir (OMNICEF) 300 MG capsule, Take 1 capsule by mouth 2 (Two) Times a Day for 7 days., Disp: 14 capsule, Rfl: 0    clonazePAM (KlonoPIN) 1 MG disintegrating tablet, Place 1 tablet on the tongue 3 (Three) Times a Day As Needed for Seizures., Disp: 30 tablet, Rfl: 1    cloNIDine (CATAPRES) 0.2 MG tablet, TAKE ONE TABLET BY MOUTH ONCE NIGHTLY, Disp: 90 tablet, Rfl: 1    cyclobenzaprine (FLEXERIL) 10 MG tablet, TAKE ONE TABLET BY MOUTH EVERY NIGHT AT BEDTIME, Disp: 30 tablet, Rfl: 5    diazepam (DIASTAT ACUDIAL) 10 MG rectal kit, Insert  into the rectum As Needed. Use as directed., Disp: , Rfl:     diazePAM (VALIUM) 10 MG tablet, TAKE 1/2 TO 1 TABLET BY MOUTH ONCE NIGHTLY AS NEEDED, Disp: 30 tablet, Rfl: 1    diphenoxylate-atropine (LOMOTIL) 2.5-0.025 MG per tablet, TAKE ONE TO TWO TABLETS BY MOUTH WITH EACH LOOSE STOOL *MAX OF 8 TABLETS DAILY, Disp: 60 tablet, Rfl: 2    divalproex (DEPAKOTE) 500 MG DR tablet, Take 1 tablet by mouth 3 (Three) Times a Day., Disp: 90 tablet, Rfl: 11    Lactobacillus Reuteri (BIOGAIA PROBIOTIC) liquid, Standard dose daily, Disp: 1 bottle, Rfl: 2    Midazolam (Nayzilam) 5 MG/0.1ML solution, into the nostril(s) as directed by provider As Needed., Disp: , Rfl:     PreviDent 5000 Enamel Protect 1.1-5 % gel, Daily., Disp: , Rfl:     QUEtiapine (SEROquel) 50 MG tablet, TAKE ONE TABLET BY MOUTH TWICE A DAY AS NEEDED, Disp: 60 tablet, Rfl: 5    risperiDONE (risperDAL) 4 MG  "tablet, TAKE ONE TABLET BY MOUTH DAILY, Disp: 30 tablet, Rfl: 5    temazepam (RESTORIL) 30 MG capsule, TAKE ONE CAPSULE BY MOUTH ONCE NIGHTLY AS NEEDED FOR SLEEP (Patient taking differently: Take 1 capsule by mouth Every Night.), Disp: 30 capsule, Rfl: 2    Valproate Sodium (DEPAKENE PO), Take 500 mg by mouth 3 times a day., Disp: , Rfl:     valproic acid (DEPAKENE) 250 MG capsule, Take 2 capsules by mouth 2 (Two) Times a Day., Disp: , Rfl:     zolpidem (AMBIEN) 10 MG tablet, TAKE ONE TABLET BY MOUTH EVERY NIGHT AT BEDTIME, Disp: 30 tablet, Rfl: 2    Latuda 120 MG tablet tablet, TAKE ONE TABLET BY MOUTH DAILY (Patient not taking: Reported on 12/5/2023), Disp: 30 tablet, Rfl: 0    trimethoprim-polymyxin b (Polytrim) 92553-9.1 UNIT/ML-% ophthalmic solution, Administer 1 drop to both eyes Every 4 (Four) Hours., Disp: 10 mL, Rfl: 0    Allergies:   No Known Allergies        PHQ-2 Total Score:     PHQ-9 Total Score:        Intimate partner violence: (Screen on initial visit, older adult with injury or evidence of neglect):  Violence can be a problem in many people's lives, so I now ask every patient about trauma or abuse they may have experienced in a relationship.  Stress/Safety - Do you feel safe in your relationship?  Afraid/Abused - Have you ever been in a relationship where you were threatened, hurt, or afraid?  Friend/Family - Are your friends aware you have been hurt?  Emergency Plan - Do you have a safe place to go and the resources you need in an emergency?    Osteoporosis:   Men: history of low trauma fracture, androgen deprivation therapy for prostate cancer, hypogonadism, primary hyperparathyroidism, intestinal disorders.       Physical Exam:  Vital Signs:   Vitals:    12/05/23 1604   BP: 126/84   BP Location: Right arm   Patient Position: Sitting   Cuff Size: Adult   Pulse: 118   Temp: 96.9 °F (36.1 °C)   TempSrc: Infrared   SpO2: 97%   Weight: 65.5 kg (144 lb 6.4 oz)   Height: 172.7 cm (68\")     Body mass " index is 21.96 kg/m².     Physical Exam  Vitals and nursing note reviewed.   Constitutional:       General: He is awake.      Appearance: Normal appearance. He is well-developed.   HENT:      Head: Normocephalic and atraumatic.      Right Ear: Tympanic membrane, ear canal and external ear normal.      Left Ear: Tympanic membrane, ear canal and external ear normal.      Nose: Nose normal.      Mouth/Throat:      Mouth: Mucous membranes are moist.      Pharynx: Oropharynx is clear.   Eyes:      Extraocular Movements: Extraocular movements intact.      Conjunctiva/sclera: Conjunctivae normal.      Pupils: Pupils are equal, round, and reactive to light.   Cardiovascular:      Rate and Rhythm: Normal rate and regular rhythm.      Pulses: Normal pulses.      Heart sounds: Normal heart sounds.   Pulmonary:      Effort: Pulmonary effort is normal.      Breath sounds: Normal breath sounds.   Abdominal:      General: Bowel sounds are normal.      Palpations: Abdomen is soft.   Musculoskeletal:         General: Normal range of motion.      Cervical back: Normal range of motion and neck supple.      Right lower leg: No edema.      Left lower leg: No edema.   Skin:     General: Skin is warm.      Capillary Refill: Capillary refill takes less than 2 seconds.   Neurological:      General: No focal deficit present.      Mental Status: He is alert and oriented to person, place, and time.   Psychiatric:         Mood and Affect: Mood is anxious.         Speech: He is noncommunicative.         Behavior: Behavior is agitated. Behavior is cooperative.         Judgment: Judgment is impulsive.      Comments: Patient was very cooperative for exam today.       Procedures      Assessment/Plan:   Diagnoses and all orders for this visit:    1. Annual physical exam (Primary)    2. Right otitis media with effusion  -     cefdinir (OMNICEF) 300 MG capsule; Take 1 capsule by mouth 2 (Two) Times a Day for 7 days.  Dispense: 14 capsule; Refill:  0    3. Diarrhea, unspecified type  -     Gastrointestinal Panel, PCR - Stool, Per Rectum  -     Clostridioides difficile EIA - , Per Rectum    4. Active autistic disorder    5. Nonintractable epilepsy without status epilepticus, unspecified epilepsy type    6. Irritable mood    Other orders  -     Discontinue: cefdinir (OMNICEF) 300 MG capsule; Take 1 capsule by mouth 2 (Two) Times a Day for 7 days.  Dispense: 14 capsule; Refill: 0       Discussed vaccines but mother and father declined  We discussed behaviors and medication management for patient.   Encouraged to continue risperidone for irritable and manic moods    Filled out paperwork for Gregory Fernandez exam for chronic autistic disorder and epilepsy    Keep appts as scheduled for PCP and Neurology for epilepsy  Take to ER for worsening epilepsy symptoms or uncontrolled by home medications.     Sent home with stool culture kit and instructed to bring back in when collected to r/o bacteria and/or c.diff    Take cefdinir as prescribed to aid in right OM  Monitor for worsening symptoms  Educated that cefdinir may worse diarrhea  Encouraged to start on probiotic         Follow Up:   Return for Next scheduled follow up.    Healthcare Maintenance:   Counseling provided on preventative care and vaccines  Robert Fernandez voices understanding and acceptance of this advice and will call back with any further questions or concerns. AVS with preventive healthcare tips printed for patient.       Mariah Foster. GHAZALA   [facility]

## 2023-12-12 DIAGNOSIS — R46.89 AGGRESSIVE BEHAVIOR: ICD-10-CM

## 2023-12-14 RX ORDER — DIAZEPAM 10 MG/1
TABLET ORAL
Qty: 30 TABLET | Refills: 2 | Status: SHIPPED | OUTPATIENT
Start: 2023-12-14

## 2024-01-18 DIAGNOSIS — F51.01 PRIMARY INSOMNIA: ICD-10-CM

## 2024-01-19 RX ORDER — TEMAZEPAM 30 MG/1
30 CAPSULE ORAL NIGHTLY PRN
Qty: 30 CAPSULE | Refills: 2 | Status: SHIPPED | OUTPATIENT
Start: 2024-01-19

## 2024-01-19 RX ORDER — ZOLPIDEM TARTRATE 10 MG/1
TABLET ORAL
Qty: 30 TABLET | Refills: 2 | Status: SHIPPED | OUTPATIENT
Start: 2024-01-19

## 2024-03-12 DIAGNOSIS — R46.89 AGGRESSIVE BEHAVIOR: ICD-10-CM

## 2024-03-12 DIAGNOSIS — R45.4 IRRITABLE MOOD: ICD-10-CM

## 2024-03-12 DIAGNOSIS — F84.0 ACTIVE AUTISTIC DISORDER: ICD-10-CM

## 2024-03-13 RX ORDER — LURASIDONE HYDROCHLORIDE 120 MG/1
TABLET, FILM COATED ORAL
Qty: 30 TABLET | Refills: 0 | Status: SHIPPED | OUTPATIENT
Start: 2024-03-13

## 2024-03-21 DIAGNOSIS — F51.01 PRIMARY INSOMNIA: ICD-10-CM

## 2024-03-22 RX ORDER — CYCLOBENZAPRINE HCL 10 MG
TABLET ORAL
Qty: 30 TABLET | Refills: 5 | OUTPATIENT
Start: 2024-03-22

## 2024-04-11 DIAGNOSIS — R45.4 IRRITABLE MOOD: ICD-10-CM

## 2024-04-11 DIAGNOSIS — F84.0 ACTIVE AUTISTIC DISORDER: ICD-10-CM

## 2024-04-11 DIAGNOSIS — R46.89 AGGRESSIVE BEHAVIOR: ICD-10-CM

## 2024-04-11 RX ORDER — LURASIDONE HYDROCHLORIDE 120 MG/1
120 TABLET, FILM COATED ORAL DAILY
Qty: 30 TABLET | Refills: 0 | Status: SHIPPED | OUTPATIENT
Start: 2024-04-11

## 2024-04-20 DIAGNOSIS — F51.01 PRIMARY INSOMNIA: ICD-10-CM

## 2024-04-20 DIAGNOSIS — F84.0 ACTIVE AUTISTIC DISORDER: ICD-10-CM

## 2024-04-20 DIAGNOSIS — R46.89 AGGRESSIVE BEHAVIOR: ICD-10-CM

## 2024-04-23 NOTE — TELEPHONE ENCOUNTER
Name: Priti Fernandez      Relationship: Mother      Best Callback Number: [phone]       HUB PROVIDED THE RELAY MESSAGE FROM THE OFFICE      PATIENT: SCHEDULED PER NOTE    ADDITIONAL INFORMATION: PATIENT HAS APPOINTMENT ON 5/10/24 AND IS OUT OF HIS MEDICATION.

## 2024-04-24 RX ORDER — CLONIDINE HYDROCHLORIDE 0.2 MG/1
0.2 TABLET ORAL NIGHTLY
Qty: 90 TABLET | Refills: 1 | Status: SHIPPED | OUTPATIENT
Start: 2024-04-24

## 2024-04-24 RX ORDER — QUETIAPINE FUMARATE 50 MG/1
TABLET, FILM COATED ORAL
Qty: 60 TABLET | Refills: 5 | Status: SHIPPED | OUTPATIENT
Start: 2024-04-24

## 2024-04-24 RX ORDER — TEMAZEPAM 30 MG/1
30 CAPSULE ORAL NIGHTLY PRN
Qty: 30 CAPSULE | Refills: 2 | Status: SHIPPED | OUTPATIENT
Start: 2024-04-24

## 2024-04-24 RX ORDER — ZOLPIDEM TARTRATE 10 MG/1
10 TABLET ORAL
Qty: 30 TABLET | Refills: 2 | Status: SHIPPED | OUTPATIENT
Start: 2024-04-24

## 2024-05-09 DIAGNOSIS — F84.0 ACTIVE AUTISTIC DISORDER: ICD-10-CM

## 2024-05-09 DIAGNOSIS — R45.4 IRRITABLE MOOD: ICD-10-CM

## 2024-05-09 DIAGNOSIS — R46.89 AGGRESSIVE BEHAVIOR: ICD-10-CM

## 2024-05-09 RX ORDER — LURASIDONE HYDROCHLORIDE 120 MG/1
120 TABLET, FILM COATED ORAL DAILY
Qty: 30 TABLET | Refills: 0 | Status: SHIPPED | OUTPATIENT
Start: 2024-05-09

## 2024-05-24 DIAGNOSIS — F51.01 PRIMARY INSOMNIA: ICD-10-CM

## 2024-05-28 RX ORDER — CYCLOBENZAPRINE HCL 10 MG
TABLET ORAL
Qty: 30 TABLET | Refills: 5 | Status: SHIPPED | OUTPATIENT
Start: 2024-05-28

## 2024-06-05 DIAGNOSIS — F84.0 ACTIVE AUTISTIC DISORDER: ICD-10-CM

## 2024-06-05 DIAGNOSIS — R46.89 AGGRESSIVE BEHAVIOR: ICD-10-CM

## 2024-06-05 DIAGNOSIS — R45.4 IRRITABLE MOOD: ICD-10-CM

## 2024-06-05 RX ORDER — LURASIDONE HYDROCHLORIDE 120 MG/1
120 TABLET, FILM COATED ORAL DAILY
Qty: 30 TABLET | Refills: 0 | Status: SHIPPED | OUTPATIENT
Start: 2024-06-05

## 2024-06-20 DIAGNOSIS — R45.4 IRRITABLE MOOD: ICD-10-CM

## 2024-06-20 DIAGNOSIS — F84.0 ACTIVE AUTISTIC DISORDER: ICD-10-CM

## 2024-07-03 DIAGNOSIS — R46.89 AGGRESSIVE BEHAVIOR: ICD-10-CM

## 2024-07-03 DIAGNOSIS — R45.4 IRRITABLE MOOD: ICD-10-CM

## 2024-07-03 DIAGNOSIS — F84.0 ACTIVE AUTISTIC DISORDER: ICD-10-CM

## 2024-07-05 ENCOUNTER — OFFICE VISIT (OUTPATIENT)
Dept: FAMILY MEDICINE CLINIC | Facility: CLINIC | Age: 24
End: 2024-07-05
Payer: COMMERCIAL

## 2024-07-05 VITALS
WEIGHT: 150 LBS | BODY MASS INDEX: 22.73 KG/M2 | TEMPERATURE: 98 F | HEART RATE: 111 BPM | HEIGHT: 68 IN | OXYGEN SATURATION: 96 %

## 2024-07-05 DIAGNOSIS — K59.1 FUNCTIONAL DIARRHEA: ICD-10-CM

## 2024-07-05 DIAGNOSIS — E56.9 VITAMIN DEFICIENCY: ICD-10-CM

## 2024-07-05 DIAGNOSIS — G40.909 NONINTRACTABLE EPILEPSY WITHOUT STATUS EPILEPTICUS, UNSPECIFIED EPILEPSY TYPE: ICD-10-CM

## 2024-07-05 DIAGNOSIS — R45.4 IRRITABLE MOOD: ICD-10-CM

## 2024-07-05 DIAGNOSIS — R56.9 SEIZURE: Primary | ICD-10-CM

## 2024-07-05 DIAGNOSIS — F84.0 ACTIVE AUTISTIC DISORDER: ICD-10-CM

## 2024-07-05 DIAGNOSIS — E03.9 ACQUIRED HYPOTHYROIDISM: ICD-10-CM

## 2024-07-05 DIAGNOSIS — E55.9 VITAMIN D DEFICIENCY: ICD-10-CM

## 2024-07-05 DIAGNOSIS — R46.89 AGGRESSIVE BEHAVIOR: ICD-10-CM

## 2024-07-05 DIAGNOSIS — F51.01 PRIMARY INSOMNIA: ICD-10-CM

## 2024-07-05 RX ORDER — TEMAZEPAM 30 MG/1
30 CAPSULE ORAL NIGHTLY PRN
Qty: 30 CAPSULE | Refills: 5 | Status: SHIPPED | OUTPATIENT
Start: 2024-07-05

## 2024-07-05 RX ORDER — DIAZEPAM 10 MG/1
TABLET ORAL
Qty: 30 TABLET | Refills: 2 | Status: SHIPPED | OUTPATIENT
Start: 2024-07-05

## 2024-07-05 RX ORDER — LURASIDONE HYDROCHLORIDE 120 MG/1
120 TABLET, FILM COATED ORAL DAILY
Qty: 30 TABLET | Refills: 0 | OUTPATIENT
Start: 2024-07-05

## 2024-07-05 RX ORDER — LURASIDONE HYDROCHLORIDE 120 MG/1
120 TABLET, FILM COATED ORAL DAILY
Qty: 30 TABLET | Refills: 3 | Status: SHIPPED | OUTPATIENT
Start: 2024-07-05

## 2024-07-05 RX ORDER — QUETIAPINE FUMARATE 50 MG/1
TABLET, FILM COATED ORAL
Qty: 60 TABLET | Refills: 5 | Status: SHIPPED | OUTPATIENT
Start: 2024-07-05

## 2024-07-05 RX ORDER — CLONAZEPAM 1 MG/1
1 TABLET, ORALLY DISINTEGRATING ORAL 3 TIMES DAILY PRN
Qty: 30 TABLET | Refills: 1 | Status: SHIPPED | OUTPATIENT
Start: 2024-07-05

## 2024-07-05 RX ORDER — RISPERIDONE 4 MG/1
4 TABLET ORAL DAILY
Qty: 30 TABLET | Refills: 5 | Status: SHIPPED | OUTPATIENT
Start: 2024-07-05

## 2024-07-05 RX ORDER — LACOSAMIDE 100 MG/1
TABLET ORAL
COMMUNITY
Start: 2024-04-26

## 2024-07-05 RX ORDER — ZOLPIDEM TARTRATE 10 MG/1
10 TABLET ORAL
Qty: 30 TABLET | Refills: 5 | Status: SHIPPED | OUTPATIENT
Start: 2024-07-05

## 2024-07-05 RX ORDER — RISPERIDONE 4 MG/1
4 TABLET ORAL DAILY
Qty: 30 TABLET | Refills: 5 | OUTPATIENT
Start: 2024-07-05

## 2024-07-05 RX ORDER — CLONIDINE HYDROCHLORIDE 0.2 MG/1
0.2 TABLET ORAL NIGHTLY
Qty: 90 TABLET | Refills: 1 | Status: SHIPPED | OUTPATIENT
Start: 2024-07-05

## 2024-07-05 NOTE — PROGRESS NOTES
Subjective   Robert Fernandez is a 23 y.o. male.     History of Present Illness     Pt with long Hx of aurism and agitationg  Parents continue cycle him on his medicine to get mood controlled  Risperdal does work and then he gets used to things  Abilify helps but he does not sleep  Latuda really does not help      Right now foe sleep, he is using restoril, OTC remedy (tryptophan), seroquel and ambien      Seroquel really helps him sleep    He is seeing neuro still  The neurologist did change meds and added lacosamide (vimpat) to regimen  This has helped prevent seizures        The following portions of the patient's history were reviewed and updated as appropriate: allergies, current medications, past family history, past social history, past surgical history, and problem list.    Review of Systems   Psychiatric/Behavioral:  Positive for agitation and sleep disturbance.        Objective   Physical Exam  Vitals reviewed.   Cardiovascular:      Rate and Rhythm: Normal rate.      Heart sounds: Normal heart sounds.   Pulmonary:      Effort: Pulmonary effort is normal.      Breath sounds: Normal breath sounds.   Neurological:      Mental Status: He is alert.   Psychiatric:      Comments: Nonverbal autisim         Assessment & Plan   Diagnoses and all orders for this visit:    1. Seizure (Primary)  -     Valproic Acid Level, Free  -     CBC & Differential  -     Comprehensive Metabolic Panel  -     Vitamin D,25-Hydroxy  -     Vitamin B12 & Folate  -     Valproic Acid Level, Total    2. Primary insomnia  -     temazepam (RESTORIL) 30 MG capsule; Take 1 capsule by mouth At Night As Needed for Sleep.  Dispense: 30 capsule; Refill: 5  -     zolpidem (AMBIEN) 10 MG tablet; Take 1 tablet by mouth every night at bedtime.  Dispense: 30 tablet; Refill: 5  -     cloNIDine (CATAPRES) 0.2 MG tablet; Take 1 tablet by mouth Every Night.  Dispense: 90 tablet; Refill: 1    3. Active autistic disorder  -     risperiDONE (risperDAL) 4 MG  tablet; Take 1 tablet by mouth Daily.  Dispense: 30 tablet; Refill: 5  -     cloNIDine (CATAPRES) 0.2 MG tablet; Take 1 tablet by mouth Every Night.  Dispense: 90 tablet; Refill: 1  -     clonazePAM (KlonoPIN) 1 MG disintegrating tablet; Place 1 tablet on the tongue 3 (Three) Times a Day As Needed for Seizures.  Dispense: 30 tablet; Refill: 1  -     Lurasidone HCl (LATUDA) 120 MG tablet tablet; Take 1 tablet by mouth Daily.  Dispense: 30 tablet; Refill: 3    4. Irritable mood  -     risperiDONE (risperDAL) 4 MG tablet; Take 1 tablet by mouth Daily.  Dispense: 30 tablet; Refill: 5  -     Lurasidone HCl (LATUDA) 120 MG tablet tablet; Take 1 tablet by mouth Daily.  Dispense: 30 tablet; Refill: 3    5. Aggressive behavior  -     QUEtiapine (SEROquel) 50 MG tablet; TAKE 1 TABLET BY MOUTH TWICE A DAY AS NEEDED  Dispense: 60 tablet; Refill: 5  -     diazePAM (VALIUM) 10 MG tablet; TAKE 1/2 TO 1 TABLET BY MOUTH TAKE TABLET BY MOUTH ONCE NIGHTLY AS NEEDED  Dispense: 30 tablet; Refill: 2  -     Lurasidone HCl (LATUDA) 120 MG tablet tablet; Take 1 tablet by mouth Daily.  Dispense: 30 tablet; Refill: 3    6. Nonintractable epilepsy without status epilepticus, unspecified epilepsy type  -     clonazePAM (KlonoPIN) 1 MG disintegrating tablet; Place 1 tablet on the tongue 3 (Three) Times a Day As Needed for Seizures.  Dispense: 30 tablet; Refill: 1    7. Functional diarrhea    8. Acquired hypothyroidism  -     TSH+Free T4  -     CBC & Differential  -     Comprehensive Metabolic Panel    9. Vitamin D deficiency  -     Vitamin D,25-Hydroxy    10. Vitamin deficiency  -     Vitamin D,25-Hydroxy  -     Vitamin B12 & Folate      Severe autism with behavioral and sleep issues.  We discussed that pt is on a unique regimen for control and parents are alternating medicitons as needed with dcnet improvement in behavior, agitation, and sleep.  Refmaddi and NEHEMIAS reveiwed.

## 2024-07-08 LAB
25(OH)D3+25(OH)D2 SERPL-MCNC: 26 NG/ML (ref 30–100)
ALBUMIN SERPL-MCNC: 5 G/DL (ref 4.3–5.2)
ALP SERPL-CCNC: 82 IU/L (ref 44–121)
ALT SERPL-CCNC: 43 IU/L (ref 0–44)
AST SERPL-CCNC: 37 IU/L (ref 0–40)
BASOPHILS # BLD AUTO: 0 X10E3/UL (ref 0–0.2)
BASOPHILS NFR BLD AUTO: 1 %
BILIRUB SERPL-MCNC: 0.6 MG/DL (ref 0–1.2)
BUN SERPL-MCNC: 10 MG/DL (ref 6–20)
BUN/CREAT SERPL: 10 (ref 9–20)
CALCIUM SERPL-MCNC: 10.6 MG/DL (ref 8.7–10.2)
CHLORIDE SERPL-SCNC: 101 MMOL/L (ref 96–106)
CO2 SERPL-SCNC: 25 MMOL/L (ref 20–29)
CREAT SERPL-MCNC: 1.04 MG/DL (ref 0.76–1.27)
EGFRCR SERPLBLD CKD-EPI 2021: 102 ML/MIN/1.73
EOSINOPHIL # BLD AUTO: 0.1 X10E3/UL (ref 0–0.4)
EOSINOPHIL NFR BLD AUTO: 1 %
ERYTHROCYTE [DISTWIDTH] IN BLOOD BY AUTOMATED COUNT: 14.6 % (ref 11.6–15.4)
FOLATE SERPL-MCNC: 9.4 NG/ML
GLOBULIN SER CALC-MCNC: 2.8 G/DL (ref 1.5–4.5)
GLUCOSE SERPL-MCNC: 111 MG/DL (ref 70–99)
HCT VFR BLD AUTO: 42.6 % (ref 37.5–51)
HGB BLD-MCNC: 14.2 G/DL (ref 13–17.7)
IMM GRANULOCYTES # BLD AUTO: 0 X10E3/UL (ref 0–0.1)
IMM GRANULOCYTES NFR BLD AUTO: 0 %
LYMPHOCYTES # BLD AUTO: 1.9 X10E3/UL (ref 0.7–3.1)
LYMPHOCYTES NFR BLD AUTO: 34 %
MCH RBC QN AUTO: 28.8 PG (ref 26.6–33)
MCHC RBC AUTO-ENTMCNC: 33.3 G/DL (ref 31.5–35.7)
MCV RBC AUTO: 86 FL (ref 79–97)
MONOCYTES # BLD AUTO: 0.7 X10E3/UL (ref 0.1–0.9)
MONOCYTES NFR BLD AUTO: 12 %
NEUTROPHILS # BLD AUTO: 2.8 X10E3/UL (ref 1.4–7)
NEUTROPHILS NFR BLD AUTO: 52 %
PLATELET # BLD AUTO: 143 X10E3/UL (ref 150–450)
POTASSIUM SERPL-SCNC: 4.2 MMOL/L (ref 3.5–5.2)
PROT SERPL-MCNC: 7.8 G/DL (ref 6–8.5)
RBC # BLD AUTO: 4.93 X10E6/UL (ref 4.14–5.8)
SODIUM SERPL-SCNC: 141 MMOL/L (ref 134–144)
T4 FREE SERPL-MCNC: 1.02 NG/DL (ref 0.82–1.77)
TSH SERPL DL<=0.005 MIU/L-ACNC: 3.01 UIU/ML (ref 0.45–4.5)
VALPROATE FREE SERPL-MCNC: 21.5 UG/ML (ref 6–22)
VALPROATE SERPL-MCNC: 107 UG/ML (ref 50–100)
VIT B12 SERPL-MCNC: 801 PG/ML (ref 232–1245)
WBC # BLD AUTO: 5.5 X10E3/UL (ref 3.4–10.8)

## 2024-09-23 DIAGNOSIS — G40.909 NONINTRACTABLE EPILEPSY WITHOUT STATUS EPILEPTICUS, UNSPECIFIED EPILEPSY TYPE: ICD-10-CM

## 2024-09-23 DIAGNOSIS — F84.0 ACTIVE AUTISTIC DISORDER: ICD-10-CM

## 2024-09-27 RX ORDER — CLONAZEPAM 1 MG/1
TABLET, ORALLY DISINTEGRATING ORAL
Qty: 30 TABLET | Refills: 0 | Status: SHIPPED | OUTPATIENT
Start: 2024-09-27

## 2024-10-24 DIAGNOSIS — F51.01 PRIMARY INSOMNIA: ICD-10-CM

## 2024-10-24 DIAGNOSIS — F84.0 ACTIVE AUTISTIC DISORDER: ICD-10-CM

## 2024-10-25 RX ORDER — CLONIDINE HYDROCHLORIDE 0.2 MG/1
0.2 TABLET ORAL NIGHTLY
Qty: 90 TABLET | Refills: 1 | Status: SHIPPED | OUTPATIENT
Start: 2024-10-25

## 2024-11-07 DIAGNOSIS — R46.89 AGGRESSIVE BEHAVIOR: ICD-10-CM

## 2024-11-07 DIAGNOSIS — F84.0 ACTIVE AUTISTIC DISORDER: ICD-10-CM

## 2024-11-07 DIAGNOSIS — R45.4 IRRITABLE MOOD: ICD-10-CM

## 2024-11-07 RX ORDER — LURASIDONE HYDROCHLORIDE 120 MG/1
120 TABLET, FILM COATED ORAL DAILY
Qty: 30 TABLET | Refills: 3 | Status: SHIPPED | OUTPATIENT
Start: 2024-11-07

## 2024-11-18 ENCOUNTER — PATIENT MESSAGE (OUTPATIENT)
Dept: FAMILY MEDICINE CLINIC | Facility: CLINIC | Age: 24
End: 2024-11-18
Payer: COMMERCIAL

## 2024-11-19 ENCOUNTER — TELEPHONE (OUTPATIENT)
Dept: FAMILY MEDICINE CLINIC | Facility: CLINIC | Age: 24
End: 2024-11-19
Payer: COMMERCIAL

## 2024-11-19 NOTE — TELEPHONE ENCOUNTER
"Copied from [employer] msg:  \"Robert Grimaldo Dr. is turning 24 next month and technically is supposed to rotate off of my health insurance. [facility] has a policy where there is an option for them to be considered to stay on the subscriber’s insurance if they are deemed disabled.   Can you please fill out the last page of this paperwork and send back to me at your earliest convenience? I need to return this as soon as possible and appreciate your assistance in this matter.\"    Attachment printed.  "

## 2024-11-26 ENCOUNTER — TELEPHONE (OUTPATIENT)
Dept: FAMILY MEDICINE CLINIC | Facility: CLINIC | Age: 24
End: 2024-11-26
Payer: COMMERCIAL

## 2024-11-26 NOTE — TELEPHONE ENCOUNTER
Deidra abbott/ Kim ([phone]) in [place] is asking if Dr Dailey is ok w/ pt filling the Diazepam? The pt just sent in a rf request for this medication to them today.     He's already taking Temazepam and Clonazepam so she's just double checking.

## 2024-11-26 NOTE — TELEPHONE ENCOUNTER
Yes, I am./  pt is careful about what they use but Lopez severe autism sometimes needs agressive medication management

## 2024-12-11 DIAGNOSIS — G40.909 NONINTRACTABLE EPILEPSY WITHOUT STATUS EPILEPTICUS, UNSPECIFIED EPILEPSY TYPE: ICD-10-CM

## 2024-12-11 DIAGNOSIS — F84.0 ACTIVE AUTISTIC DISORDER: ICD-10-CM

## 2024-12-11 RX ORDER — CLONAZEPAM 1 MG/1
TABLET, ORALLY DISINTEGRATING ORAL
Qty: 30 TABLET | Refills: 3 | Status: SHIPPED | OUTPATIENT
Start: 2024-12-11

## 2024-12-12 DIAGNOSIS — G40.909 NONINTRACTABLE EPILEPSY WITHOUT STATUS EPILEPTICUS, UNSPECIFIED EPILEPSY TYPE: ICD-10-CM

## 2024-12-12 DIAGNOSIS — F84.0 ACTIVE AUTISTIC DISORDER: ICD-10-CM

## 2024-12-12 RX ORDER — CLONAZEPAM 1 MG/1
TABLET, ORALLY DISINTEGRATING ORAL
Qty: 30 TABLET | Refills: 3 | Status: CANCELLED | OUTPATIENT
Start: 2024-12-12

## 2025-01-02 DIAGNOSIS — F84.0 ACTIVE AUTISTIC DISORDER: ICD-10-CM

## 2025-01-02 DIAGNOSIS — R45.4 IRRITABLE MOOD: ICD-10-CM

## 2025-01-02 RX ORDER — RISPERIDONE 4 MG/1
4 TABLET ORAL DAILY
Qty: 30 TABLET | Refills: 5 | Status: SHIPPED | OUTPATIENT
Start: 2025-01-02

## 2025-01-31 DIAGNOSIS — F51.01 PRIMARY INSOMNIA: ICD-10-CM

## 2025-01-31 RX ORDER — ZOLPIDEM TARTRATE 10 MG/1
10 TABLET ORAL
Qty: 30 TABLET | Refills: 5 | Status: SHIPPED | OUTPATIENT
Start: 2025-01-31

## 2025-03-01 DIAGNOSIS — R45.4 IRRITABLE MOOD: ICD-10-CM

## 2025-03-01 DIAGNOSIS — R46.89 AGGRESSIVE BEHAVIOR: ICD-10-CM

## 2025-03-01 DIAGNOSIS — F84.0 ACTIVE AUTISTIC DISORDER: ICD-10-CM

## 2025-03-03 RX ORDER — LURASIDONE HYDROCHLORIDE 120 MG/1
120 TABLET, FILM COATED ORAL DAILY
Qty: 30 TABLET | Refills: 3 | Status: SHIPPED | OUTPATIENT
Start: 2025-03-03

## 2025-03-19 ENCOUNTER — OFFICE VISIT (OUTPATIENT)
Dept: FAMILY MEDICINE CLINIC | Facility: CLINIC | Age: 25
End: 2025-03-19

## 2025-03-19 VITALS
SYSTOLIC BLOOD PRESSURE: 128 MMHG | WEIGHT: 152 LBS | OXYGEN SATURATION: 94 % | RESPIRATION RATE: 20 BRPM | HEART RATE: 110 BPM | HEIGHT: 68 IN | TEMPERATURE: 97.8 F | BODY MASS INDEX: 23.04 KG/M2 | DIASTOLIC BLOOD PRESSURE: 82 MMHG

## 2025-03-19 DIAGNOSIS — H92.09 EARACHE: ICD-10-CM

## 2025-03-19 DIAGNOSIS — F84.0 ACTIVE AUTISTIC DISORDER: ICD-10-CM

## 2025-03-19 DIAGNOSIS — J18.9 WALKING PNEUMONIA: Primary | ICD-10-CM

## 2025-03-19 DIAGNOSIS — H66.002 NON-RECURRENT ACUTE SUPPURATIVE OTITIS MEDIA OF LEFT EAR WITHOUT SPONTANEOUS RUPTURE OF TYMPANIC MEMBRANE: ICD-10-CM

## 2025-03-19 LAB
EXPIRATION DATE: NORMAL
FLUAV AG UPPER RESP QL IA.RAPID: NOT DETECTED
FLUBV AG UPPER RESP QL IA.RAPID: NOT DETECTED
INTERNAL CONTROL: NORMAL
Lab: NORMAL
SARS-COV-2 AG UPPER RESP QL IA.RAPID: NOT DETECTED

## 2025-03-19 PROCEDURE — 87428 SARSCOV & INF VIR A&B AG IA: CPT | Performed by: FAMILY MEDICINE

## 2025-03-19 PROCEDURE — 99213 OFFICE O/P EST LOW 20 MIN: CPT | Performed by: FAMILY MEDICINE

## 2025-03-19 NOTE — PROGRESS NOTES
Subjective   Robert Fernandez is a 24 y.o. male.     Cough  Associated symptoms include ear pain and a fever.   Fever   Associated symptoms include coughing and ear pain.   Earache   Associated symptoms include coughing.        Acute onset infection yesterday  Lungs have been congested and breathing labored  Fever at home to 102, more coughing  Just not himself  No one else sick at hoem and he does not leave the house      He does cough a lot after he eats      The following portions of the patient's history were reviewed and updated as appropriate: allergies, current medications, past family history, past medical history, past social history, past surgical history, and problem list.    Review of Systems   Constitutional:  Positive for fever.   HENT:  Positive for ear pain.    Respiratory:  Positive for cough.        Objective   Physical Exam  Vitals and nursing note reviewed.   Constitutional:       Appearance: He is well-developed.   HENT:      Head: Normocephalic and atraumatic.      Right Ear: Hearing, tympanic membrane, ear canal and external ear normal.      Left Ear: Hearing, ear canal and external ear normal.      Ears:      Comments: TM erythematous and bulging     Nose: Nose normal.      Mouth/Throat:      Pharynx: Uvula midline.   Eyes:      Conjunctiva/sclera: Conjunctivae normal.   Cardiovascular:      Rate and Rhythm: Normal rate and regular rhythm.      Heart sounds: Normal heart sounds.   Pulmonary:      Effort: Pulmonary effort is normal.      Breath sounds: Wheezing and rhonchi present.   Musculoskeletal:      Cervical back: Normal range of motion.   Lymphadenopathy:      Cervical: No cervical adenopathy.   Neurological:      Comments: Stable, nonverbal, more fatigued than normal         Assessment & Plan   Diagnoses and all orders for this visit:    1. Walking pneumonia (Primary)  -     amoxicillin-clavulanate (AUGMENTIN) 875-125 MG per tablet; Take 1 tablet by mouth 2 (Two) Times a Day.  Dispense: 20  tablet; Refill: 0    2. Earache  -     POCT SARS-CoV-2 Antigen LEIDA + Flu    3. Non-recurrent acute suppurative otitis media of left ear without spontaneous rupture of tympanic membrane    4. Active autistic disorder    Flu and COVID negative  Lungs with rhonchi throughout and left TM< red  Treat with augmentin, family to call back INB

## 2025-04-20 DIAGNOSIS — F51.01 PRIMARY INSOMNIA: ICD-10-CM

## 2025-04-20 DIAGNOSIS — F84.0 ACTIVE AUTISTIC DISORDER: ICD-10-CM

## 2025-04-21 RX ORDER — CLONIDINE HYDROCHLORIDE 0.2 MG/1
0.2 TABLET ORAL NIGHTLY
Qty: 90 TABLET | Refills: 0 | Status: SHIPPED | OUTPATIENT
Start: 2025-04-21

## 2025-05-04 DIAGNOSIS — R46.89 AGGRESSIVE BEHAVIOR: ICD-10-CM

## 2025-05-05 ENCOUNTER — OFFICE VISIT (OUTPATIENT)
Dept: FAMILY MEDICINE CLINIC | Facility: CLINIC | Age: 25
End: 2025-05-05
Payer: COMMERCIAL

## 2025-05-05 VITALS
SYSTOLIC BLOOD PRESSURE: 120 MMHG | DIASTOLIC BLOOD PRESSURE: 80 MMHG | OXYGEN SATURATION: 97 % | WEIGHT: 153 LBS | HEIGHT: 68 IN | RESPIRATION RATE: 18 BRPM | HEART RATE: 77 BPM | BODY MASS INDEX: 23.19 KG/M2 | TEMPERATURE: 97.4 F

## 2025-05-05 DIAGNOSIS — R19.7 DIARRHEA, UNSPECIFIED TYPE: ICD-10-CM

## 2025-05-05 DIAGNOSIS — E55.9 VITAMIN D DEFICIENCY: ICD-10-CM

## 2025-05-05 DIAGNOSIS — F80.9 LANGUAGE DEVELOPMENT DISORDER: ICD-10-CM

## 2025-05-05 DIAGNOSIS — K58.0 IRRITABLE BOWEL SYNDROME WITH DIARRHEA: ICD-10-CM

## 2025-05-05 DIAGNOSIS — F84.0 ACTIVE AUTISTIC DISORDER: ICD-10-CM

## 2025-05-05 DIAGNOSIS — R45.4 IRRITABLE MOOD: ICD-10-CM

## 2025-05-05 DIAGNOSIS — F51.01 PRIMARY INSOMNIA: ICD-10-CM

## 2025-05-05 DIAGNOSIS — Z00.00 ANNUAL PHYSICAL EXAM: Primary | ICD-10-CM

## 2025-05-05 DIAGNOSIS — G40.909 NONINTRACTABLE EPILEPSY WITHOUT STATUS EPILEPTICUS, UNSPECIFIED EPILEPSY TYPE: ICD-10-CM

## 2025-05-05 DIAGNOSIS — R46.89 AGGRESSIVE BEHAVIOR: ICD-10-CM

## 2025-05-05 RX ORDER — RISPERIDONE 4 MG/1
4 TABLET ORAL DAILY
Qty: 30 TABLET | Refills: 5 | Status: SHIPPED | OUTPATIENT
Start: 2025-05-05

## 2025-05-05 RX ORDER — QUETIAPINE FUMARATE 50 MG/1
50 TABLET, FILM COATED ORAL EVERY 12 HOURS SCHEDULED
Qty: 60 TABLET | Refills: 5 | Status: SHIPPED | OUTPATIENT
Start: 2025-05-05

## 2025-05-05 RX ORDER — CLONIDINE HYDROCHLORIDE 0.2 MG/1
0.2 TABLET ORAL NIGHTLY
Qty: 90 TABLET | Refills: 0 | Status: SHIPPED | OUTPATIENT
Start: 2025-05-05

## 2025-05-05 RX ORDER — LURASIDONE HYDROCHLORIDE 120 MG/1
120 TABLET, FILM COATED ORAL DAILY
Qty: 30 TABLET | Refills: 3 | Status: SHIPPED | OUTPATIENT
Start: 2025-05-05

## 2025-05-05 RX ORDER — CLONAZEPAM 1 MG/1
TABLET, ORALLY DISINTEGRATING ORAL
Qty: 30 TABLET | Refills: 3 | Status: SHIPPED | OUTPATIENT
Start: 2025-05-05

## 2025-05-05 RX ORDER — ZOLPIDEM TARTRATE 10 MG/1
10 TABLET ORAL
Qty: 30 TABLET | Refills: 5 | Status: SHIPPED | OUTPATIENT
Start: 2025-05-05

## 2025-05-05 RX ORDER — LOPERAMIDE HYDROCHLORIDE 2 MG/1
TABLET ORAL
Qty: 240 TABLET | Refills: 3 | Status: SHIPPED | OUTPATIENT
Start: 2025-05-05

## 2025-05-05 RX ORDER — TEMAZEPAM 30 MG/1
30 CAPSULE ORAL NIGHTLY PRN
Qty: 30 CAPSULE | Refills: 5 | Status: SHIPPED | OUTPATIENT
Start: 2025-05-05

## 2025-05-05 RX ORDER — QUETIAPINE FUMARATE 50 MG/1
50 TABLET, FILM COATED ORAL EVERY 12 HOURS SCHEDULED
Qty: 60 TABLET | Refills: 5 | Status: SHIPPED | OUTPATIENT
Start: 2025-05-05 | End: 2025-05-05 | Stop reason: SDUPTHER

## 2025-05-05 RX ORDER — CYCLOBENZAPRINE HCL 10 MG
TABLET ORAL
Qty: 30 TABLET | Refills: 5 | Status: SHIPPED | OUTPATIENT
Start: 2025-05-05

## 2025-05-05 RX ORDER — DIAZEPAM 10 MG/1
TABLET ORAL
Qty: 30 TABLET | Refills: 2 | Status: SHIPPED | OUTPATIENT
Start: 2025-05-05

## 2025-05-05 RX ORDER — VALPROIC ACID 250 MG/1
500 CAPSULE ORAL 3 TIMES DAILY
COMMUNITY
Start: 2024-07-26 | End: 2025-07-26

## 2025-05-05 NOTE — PROGRESS NOTES
Subjective   Robert Fernandez is a 24 y.o. male.     History of Present Illness     Robert Fernandez 24 y.o. male who presents for yearly preventive exam.  His overall health is: unchanged  He exercises he is very active..  He does see his dentist regularly  His diet is  food eating changes  He knows what his cholesterol is No  Does patient smoke No   He has chronic diarrhea going back for the past 18 months  Using immodium      He does take immodium and this has helped diarrhea but this problem is quite difficult    The following portions of the patient's history were reviewed and updated as appropriate: allergies, current medications, past family history, past medical history, past social history, past surgical history, and problem list.    Review of Systems   Constitutional: Negative.    Gastrointestinal:  Positive for diarrhea.       Objective   Physical Exam  Vitals and nursing note reviewed.   Constitutional:       General: He is not in acute distress.     Appearance: Normal appearance. He is well-developed.   Cardiovascular:      Rate and Rhythm: Normal rate and regular rhythm.      Heart sounds: Normal heart sounds.   Pulmonary:      Effort: Pulmonary effort is normal.      Breath sounds: Normal breath sounds.   Psychiatric:      Comments: Nonverbal, autistic, stable, does not provide history         Assessment & Plan   Diagnoses and all orders for this visit:    1. Annual physical exam (Primary)    2. Diarrhea, unspecified type  -     Fecal Leukocytes - Stool, Per Rectum  -     Gastrointestinal Panel, PCR - Stool, Per Rectum  -     loperamide (Imodium A-D) 2 MG tablet; Max 8 in 24 hours  Dispense: 240 tablet; Refill: 3    3. Irritable bowel syndrome with diarrhea  -     riFAXIMin (Xifaxan) 550 MG tablet; Take 1 tablet by mouth 3 (Three) Times a Day.  Dispense: 42 tablet; Refill: 0    4. Vitamin D deficiency  -     Vitamin D,25-Hydroxy    5. Active autistic disorder  -     Lurasidone HCl (LATUDA) 120 MG tablet tablet;  Take 1 tablet by mouth Daily.  Dispense: 30 tablet; Refill: 3  -     risperiDONE (risperDAL) 4 MG tablet; Take 1 tablet by mouth Daily.  Dispense: 30 tablet; Refill: 5  -     clonazePAM (KlonoPIN) 1 MG disintegrating tablet; 1 on tongue TID PRN agitation  Dispense: 30 tablet; Refill: 3  -     cloNIDine (CATAPRES) 0.2 MG tablet; Take 1 tablet by mouth Every Night.  Dispense: 90 tablet; Refill: 0    6. Language development disorder    7. Aggressive behavior  -     CBC & Differential  -     Comprehensive Metabolic Panel  -     TSH  -     Vitamin B12 & Folate  -     Valproic Acid (Total+Free)  -     Lurasidone HCl (LATUDA) 120 MG tablet tablet; Take 1 tablet by mouth Daily.  Dispense: 30 tablet; Refill: 3  -     diazePAM (VALIUM) 10 MG tablet; TAKE 1/2 TO 1 TABLET BY MOUTH TAKE TABLET BY MOUTH ONCE NIGHTLY AS NEEDED  Dispense: 30 tablet; Refill: 2  -     QUEtiapine (SEROquel) 50 MG tablet; Take 1 tablet by mouth Every 12 (Twelve) Hours.  Dispense: 60 tablet; Refill: 5    8. Irritable mood  -     Lurasidone HCl (LATUDA) 120 MG tablet tablet; Take 1 tablet by mouth Daily.  Dispense: 30 tablet; Refill: 3  -     risperiDONE (risperDAL) 4 MG tablet; Take 1 tablet by mouth Daily.  Dispense: 30 tablet; Refill: 5    9. Nonintractable epilepsy without status epilepticus, unspecified epilepsy type  -     clonazePAM (KlonoPIN) 1 MG disintegrating tablet; 1 on tongue TID PRN agitation  Dispense: 30 tablet; Refill: 3    10. Primary insomnia  -     cloNIDine (CATAPRES) 0.2 MG tablet; Take 1 tablet by mouth Every Night.  Dispense: 90 tablet; Refill: 0  -     temazepam (RESTORIL) 30 MG capsule; Take 1 capsule by mouth At Night As Needed for Sleep.  Dispense: 30 capsule; Refill: 5  -     zolpidem (AMBIEN) 10 MG tablet; Take 1 tablet by mouth every night at bedtime.  Dispense: 30 tablet; Refill: 5  -     cyclobenzaprine (FLEXERIL) 10 MG tablet; TAKE ONE TABLET BY MOUTH EVERY NIGHT AT BEDTIME  Dispense: 30 tablet; Refill: 5    Pt stable,  continues to deal with emotions, sleep, and behavior.  Will continue medications as above    Diarrhea continues to be an issue.  Will try immodium and see if xifaxan covers.  Consider lomotil in future.    Behavior is stable, poor and sleep remains an elusive issue despite multiple medications

## 2025-05-08 ENCOUNTER — TELEPHONE (OUTPATIENT)
Dept: FAMILY MEDICINE CLINIC | Facility: CLINIC | Age: 25
End: 2025-05-08

## 2025-05-08 NOTE — TELEPHONE ENCOUNTER
Left Voicemail with patient's father to call the office back. Issue was pharmacy needed frequency of medication, I gave them the information and they were able to get the medication pushed through and ready to fill.

## 2025-05-08 NOTE — TELEPHONE ENCOUNTER
Caller: AURE GALEAS    Relationship: Other FATHER    Best call back number:  [phone]    Which medication are you concerned about:     loperamide (Imodium A-D) 2 MG tablet       Who prescribed you this medication: DR JIMENEZ        What are your concerns: PHARMACY HAS NOT RECEIVED MEDICATION AND THIS IS FOR HIS DIARRHEA

## 2025-05-09 NOTE — TELEPHONE ENCOUNTER
Spoke to patient's mom Priti about the issue with the medication. She states they will go by pharmacy and  the medication.